# Patient Record
Sex: FEMALE | Race: WHITE | NOT HISPANIC OR LATINO | Employment: FULL TIME | URBAN - METROPOLITAN AREA
[De-identification: names, ages, dates, MRNs, and addresses within clinical notes are randomized per-mention and may not be internally consistent; named-entity substitution may affect disease eponyms.]

---

## 2017-08-15 DIAGNOSIS — F41.1 GAD (GENERALIZED ANXIETY DISORDER): ICD-10-CM

## 2017-08-15 NOTE — TELEPHONE ENCOUNTER
Medication Detail      Disp Refills Start End EMERALD   sertraline (ZOLOFT) 50 MG tablet 90 tablet 3 6/1/2016  No   Sig: Take 1 tablet (50 mg) by mouth daily   Class: E-Prescribe   Route: Oral   Order: [id number]       Last Office Visit with [facility] primary care provider:  8/11/2016        Last PHQ-9 score on record=   PHQ-9 SCORE 8/11/2016   Total Score -   Total Score 1

## 2017-08-16 NOTE — TELEPHONE ENCOUNTER
My chart message sent looks like there has been a break in medication, she should of been out in June  Carmina Lee,Clinic Rn  [facility] [place]

## 2017-08-18 NOTE — TELEPHONE ENCOUNTER
Patient hasn't read Jogg message. Left VM that she is due for a visit & gave the scheduling number. Wanted to ask about the 3 month gap.    Darleen Trejo RN

## 2017-09-20 ENCOUNTER — OFFICE VISIT (OUTPATIENT)
Dept: FAMILY MEDICINE | Facility: CLINIC | Age: 32
End: 2017-09-20
Payer: COMMERCIAL

## 2017-09-20 VITALS
WEIGHT: 213 LBS | HEIGHT: 68 IN | HEART RATE: 80 BPM | BODY MASS INDEX: 32.28 KG/M2 | SYSTOLIC BLOOD PRESSURE: 118 MMHG | TEMPERATURE: 98.4 F | DIASTOLIC BLOOD PRESSURE: 72 MMHG

## 2017-09-20 DIAGNOSIS — F33.1 MODERATE EPISODE OF RECURRENT MAJOR DEPRESSIVE DISORDER (H): Primary | ICD-10-CM

## 2017-09-20 DIAGNOSIS — G43.109 MIGRAINE WITH AURA AND WITHOUT STATUS MIGRAINOSUS, NOT INTRACTABLE: ICD-10-CM

## 2017-09-20 DIAGNOSIS — F41.1 GAD (GENERALIZED ANXIETY DISORDER): ICD-10-CM

## 2017-09-20 PROCEDURE — 99213 OFFICE O/P EST LOW 20 MIN: CPT | Performed by: FAMILY MEDICINE

## 2017-09-20 RX ORDER — NORTRIPTYLINE HCL 10 MG
30 CAPSULE ORAL AT BEDTIME
Qty: 270 CAPSULE | Refills: 3 | Status: SHIPPED | OUTPATIENT
Start: 2017-09-20 | End: 2018-02-08

## 2017-09-20 ASSESSMENT — ANXIETY QUESTIONNAIRES
GAD7 TOTAL SCORE: 14
7. FEELING AFRAID AS IF SOMETHING AWFUL MIGHT HAPPEN: MORE THAN HALF THE DAYS
3. WORRYING TOO MUCH ABOUT DIFFERENT THINGS: NEARLY EVERY DAY
5. BEING SO RESTLESS THAT IT IS HARD TO SIT STILL: NOT AT ALL
6. BECOMING EASILY ANNOYED OR IRRITABLE: NEARLY EVERY DAY
1. FEELING NERVOUS, ANXIOUS, OR ON EDGE: MORE THAN HALF THE DAYS
2. NOT BEING ABLE TO STOP OR CONTROL WORRYING: MORE THAN HALF THE DAYS

## 2017-09-20 ASSESSMENT — PATIENT HEALTH QUESTIONNAIRE - PHQ9
SUM OF ALL RESPONSES TO PHQ QUESTIONS 1-9: 18
5. POOR APPETITE OR OVEREATING: MORE THAN HALF THE DAYS

## 2017-09-20 NOTE — PATIENT INSTRUCTIONS
Ok to go back to zoloft mg. If after a couple of weeks you don't notice any improvement, ok to increase dose to 1.5 pills.     -phone follow up in a month    [facility]   Discharged by : Karon SLADE Certified Medical Assistant (AAMA)   Paper scripts provided to patient : 1 script faxed to [facility] at [phone]  If you have any questions regarding to your visit please contact your care team:   [facility] Clinic Hours Telephone Number   AB Carmona Dr., PA-C    7am-7pm Monday - Thursday   7am-5pm Fridays  [phone]   (Appointment scheduling available 24/7)   RN Line   [phone] option 2       What options do I have for visits at the clinic other than the traditional office visit?   To expand how we care for you, many of our providers are utilizing electronic visits (e-visits) and telephone visits, when medically appropriate, for interactions with their patients rather than a visit in the clinic. We also offer nurse visits for many medical concerns. Just like any other service, we will bill your insurance company for this type of visit based on time spent on the phone with your provider. Not all insurance companies cover these visits. Please check with your medical insurance if this type of visit is covered. You will be responsible for any charges that are not paid by your insurance.     E-visits via Celtaxsys: generally incur a $35.00 fee.     Telephone visits:   Time spent on the phone: *charged based on time that is spent on the phone in increments of 10 minutes. Estimated cost:   5-10 mins $30.00   11-20 mins. $59.00   21-30 mins. $85.00   Use Celtaxsys (secure email communication and access to your chart) to send your primary care provider a message or make an appointment. Ask someone on your Team how to sign up for [employer].   For a Price Quote for your services, please call our Consumer Price Line at [phone].    As always, Thank you for trusting us with your health care needs!                [facility]:    Scheduling Appointments  [place] [place]  Call: [phone]    [place] [email or web address]  Call: [phone]    [facility]  Call: [phone]

## 2017-09-20 NOTE — PROGRESS NOTES
SUBJECTIVE:   Milagro Ferrera is a 31 year old female who presents to clinic today for the following health issues:      Depression and Anxiety Follow-Up    Status since last visit: Thinks they have both worsened due to recent changes going on in personal life    Other associated symptoms:None    Complicating factors:     Significant life event: Yes-  A couple deaths in family, has a lot of stress at her job.     Current substance abuse: None    PHQ-9 SCORE 5/4/2016 6/1/2016 8/11/2016   Total Score - - -   Total Score 7 1 1     LISA-7 SCORE 5/4/2016 6/1/2016 8/11/2016   Total Score - - -   Total Score 11 5 3       PHQ-9  English  PHQ-9   Any Language  GAD7      Amount of exercise or physical activity: None outside of work    Problems taking medications regularly: Yes,  Sometimes will forget to take them.  Also has been out of her zoloft medication for about 1 month.    Medication side effects: went through some withdrawal type symptom when ran out of this medication. Had extreme heightened emotions and feeling constantly worried.     Diet: regular (no restrictions)      Medication Followup of RITALIN    Taking Medication as prescribed: NO-stopped taking it 6-7 months ago.    Side Effects:  None    Medication Helping Symptoms:  Didn't think so. She notes that it was really hard to take medication in a timely matter and decided to get off it for a week.      Reports several stressors due to both her grandparents recent bereavement. Grandfather was very secretive about his disease (lymphoma) and had a very unexpected death. Her grandfather had POA of grandmother, and then when he passed away she had to assume POA for her grandmother. Things have now settled down with regards to intrafamilial stressors. Work does continue to be very stressful and very demanding. She doesn't think zoloft was helping with her depression because she tried a trial off it for a week and didn't note any changes. Reports insomnia and  "persistent sense of despondency in her life because life feels \"bland\". Also endorses occasional suicidal ideation, but assuredly notes that she doesn't have a plan and wouldn't want to act on said ideas.     Migraines. Reports that she hasn't had any migraines since starting her nortriptyline. She does get some sporadic mild headaches, but attributes symptoms to dehydration and stress.     Questionnaire.    PHQ-9 score at 18.     LISA-7 score at 14        Suicidal ideations should be closely monitored. Advised patient if she starts having a plan.       Problem list and histories reviewed & adjusted, as indicated.  Additional history: as documented    Patient Active Problem List   Diagnosis     Moderate major depression (H)     CARDIOVASCULAR SCREENING; LDL GOAL LESS THAN 160     Migraine headache with aura     ADD (attention deficit hyperactivity disorder, inattentive type)     Past Surgical History:   Procedure Laterality Date     COSMETIC SURGERY  late 90s    mole removal: groin area and neck       Social History   Substance Use Topics     Smoking status: Passive Smoke Exposure - Never Smoker     Years: 8.00     Types: Cigarettes     Start date: 1/1/2006     Smokeless tobacco: Never Used      Comment: social smoker smoked 1-2 cigarettes/month     Alcohol use Yes      Comment: 1-2 drinks/week     Family History   Problem Relation Age of Onset     Unknown/Adopted Mother      Other Cancer Mother      cervical, melanoma, small cell carcinoma     Depression/Anxiety Mother      Bipolar     Depression Mother      MENTAL ILLNESS Mother      bi-polar     CANCER Father 44     brain     Other Cancer Father      glioblastoma     Depression/Anxiety Father      PTSD     Chemical Addiction Father      Marijuana     Depression Father      MENTAL ILLNESS Father      ptsd, depression (untreated)     Asthma Father      childhood     DIABETES Paternal Grandmother      type II caused by obesity     Other Cancer Paternal Grandmother  "     osteosarcoma     Mental Illness Paternal Grandmother      Dementia     HEART DISEASE Paternal Grandfather      Coronary Artery Disease Paternal Grandfather      Hypertension Paternal Grandfather      Hyperlipidemia Paternal Grandfather      Prostate Cancer Paternal Grandfather      Chemical Addiction Paternal Grandfather      Alcoholism     DIABETES Paternal Grandfather      type I caused by pancreatitis     Substance Abuse Paternal Grandfather      alcoholic     Thyroid Disease Paternal Grandfather      Other Cancer Paternal Grandfather      Lymphoma     Unknown/Adopted Maternal Grandmother      Depression/Anxiety Maternal Grandmother      Bipolar     Other Cancer Maternal Grandmother      Small Cell Carcinoma     Unknown/Adopted Maternal Grandfather      DIABETES Maternal Grandfather      Breast Cancer Other      Prostate Cancer Other      Great Grandfather     Chemical Addiction Other      Alcoholism     Prostate Cancer Other      Chemical Addiction Other      Alcoholism     Substance Abuse Other      alcoholic     Other Cancer Other      Small Cell Carcinoma     CEREBROVASCULAR DISEASE Other      Substance Abuse Other      alcoholic     Chemical Addiction Other      Alcoholism         Current Outpatient Prescriptions   Medication Sig Dispense Refill     sertraline (ZOLOFT) 50 MG tablet Take 1 tablet (50 mg) by mouth daily 90 tablet 3     nortriptyline (PAMELOR) 10 MG capsule Take 3 capsules (30 mg) by mouth At Bedtime 270 capsule 3     sertraline (ZOLOFT) 50 MG tablet Take 1 tablet (50 mg) by mouth daily 30 tablet 3     levonorgestrel-ethinyl estradiol (AVIANE,ALESSE,LESSINA) 0.1-20 MG-MCG per tablet Take 1 tablet by mouth daily Allow refill for 3 months with 3 refills 84 tablet 3     [DISCONTINUED] sertraline (ZOLOFT) 50 MG tablet Take 1 tablet (50 mg) by mouth daily 90 tablet 3     [DISCONTINUED] nortriptyline (PAMELOR) 10 MG capsule Take 3 capsules (30 mg) by mouth At Bedtime 270 capsule 3      "[DISCONTINUED] norgestim-eth estrad triphasic (TRINESSA, 28,) 0.18/0.215/0.25 MG-35 MCG TABS tablet Take 1 tablet by mouth daily 3 Package 3     No Known Allergies  Recent Labs   Lab Test  01/10/13   1009   LDL  133*   HDL  62   TRIG  79      BP Readings from Last 3 Encounters:   09/20/17 118/72   08/11/16 122/68   08/08/16 110/64    Wt Readings from Last 3 Encounters:   09/20/17 96.6 kg (213 lb)   08/11/16 94.5 kg (208 lb 6 oz)   08/08/16 93.4 kg (206 lb)                  Labs reviewed in EPIC          Reviewed and updated as needed this visit by clinical staff     Reviewed and updated as needed this visit by Provider         ROS:  Constitutional, HEENT, cardiovascular, pulmonary, GI, , musculoskeletal, neuro, skin, endocrine and psych systems are negative, except as otherwise noted.    This document serves as a record of the services and decisions personally performed and made by Sergio John MD. It was created on their behalf by Ady Tavera, a trained medical scribe. The creation of this document is based the provider's statements to the medical scribe.  Ady Tavera September 20, 2017 12:47 PM         OBJECTIVE:   /72 (BP Location: Right arm, Cuff Size: Adult Large)  Pulse 80  Temp 98.4  F (36.9  C) (Oral)  Ht 1.715 m (5' 7.5\")  Wt 96.6 kg (213 lb)  BMI 32.87 kg/m2  Body mass index is 32.87 kg/(m^2).  GENERAL: healthy, alert and no distress  HENT: ear canals and TM's normal, nose and mouth without ulcers or lesions  NECK: no adenopathy, no asymmetry, masses, or scars and thyroid normal to palpation  RESP: lungs clear to auscultation - no rales, rhonchi or wheezes  CV: regular rate and rhythm, normal S1 S2, no S3 or S4, no murmur, click or rub  MS: no gross musculoskeletal defects noted, no edema  PSYCH: mentation appears normal, affect normal/bright, no suicidal ideation, no homicidal ideation    Diagnostic Test Results:  none     ASSESSMENT/PLAN:   (F33.1) Moderate episode of recurrent major " depressive disorder (H)  (primary encounter diagnosis)  Comment: persistent depressive mood and insomnia. PHQ-9 score of 18 with a score of 3 on suicidal ideation. Findings were reviewed with patient, but patient not actively suicidal. SSRI therapy will be renewed.   Plan: sertraline (ZOLOFT) 50 MG tablet        -provided with Px for zoloft 50 mg QD.         -arrange for a phone follow up in a month. If not helping, consider increasing dose to 75 mg QD.      (F41.1) LISA (generalized anxiety disorder)  Comment: LISA-7 score at 14. Poorly controlled due to failure to get SSRI refilled.   Plan: sertraline (ZOLOFT) 50 MG tablet, sertraline         (ZOLOFT) 50 MG tablet        -see above    (G43.109) Migraine with aura and without status migrainosus, not intractable  Comment: controlled  Plan: nortriptyline (PAMELOR) 10 MG capsule        -medications refilled, continue present medications    Patient Instructions     Ok to go back to zoloft mg. If after a couple of weeks you don't notice any improvement, ok to increase dose to 1.5 pills.     -phone follow up in a month    [facility]   Discharged by : Karon SLADE Certified Medical Assistant (AAMA)   Paper scripts provided to patient : 1 script faxed to [facility] at [phone]  If you have any questions regarding to your visit please contact your care team:   [facility] Clinic Hours Telephone Number   AB Carmona Dr., PA-C    7am-7pm Monday - Thursday   7am-5pm Fridays  [phone]   (Appointment scheduling available 24/7)   RN Line   [phone] option 2       What options do I have for visits at the clinic other than the traditional office visit?   To expand how we care for you, many of our providers are utilizing electronic visits (e-visits) and telephone visits, when medically appropriate, for interactions with their patients rather than a visit in the clinic. We also offer  nurse visits for many medical concerns. Just like any other service, we will bill your insurance company for this type of visit based on time spent on the phone with your provider. Not all insurance companies cover these visits. Please check with your medical insurance if this type of visit is covered. You will be responsible for any charges that are not paid by your insurance.     E-visits via Naowhart: generally incur a $35.00 fee.     Telephone visits:   Time spent on the phone: *charged based on time that is spent on the phone in increments of 10 minutes. Estimated cost:   5-10 mins $30.00   11-20 mins. $59.00   21-30 mins. $85.00   Use [employer] solar systemst (secure email communication and access to your chart) to send your primary care provider a message or make an appointment. Ask someone on your Team how to sign up for [employer].   For a Price Quote for your services, please call our Consumer Price Line at [phone].   As always, Thank you for trusting us with your health care needs!                [facility]:    Scheduling Appointments  [facility] [place]  Call: [phone]    [facility]  Call: [phone]    [facility]  Call: [phone]                  The information in this document, created by the medical scribe for me, accurately reflects the services I personally performed and the decisions made by me. I have reviewed and approved this document for accuracy prior to leaving the patient care area.    rJ John MD, MD  [facility]

## 2017-09-20 NOTE — MR AVS SNAPSHOT
After Visit Summary   9/20/2017    Milagro Ferrera    MRN: [id number]           Patient Information     Date Of Birth          1985        Visit Information        Provider Department      9/20/2017 11:20 AM Jr John MD [facility]        Today's Diagnoses     Moderate episode of recurrent major depressive disorder (H)    -  1    LISA (generalized anxiety disorder)        Migraine with aura and without status migrainosus, not intractable          Care Instructions    Ok to go back to zoloft mg. If after a couple of weeks you don't notice any improvement, ok to increase dose to 1.5 pills.     -phone follow up in a month    [facility]   Discharged by : Karon SLADE Certified Medical Assistant (AAMA)   Paper scripts provided to patient : 1   If you have any questions regarding to your visit please contact your care team:   [facility] Clinic Hours Telephone Number   AB Carmona Dr., PA-C    7am-7pm Monday - Thursday   7am-5pm Fridays  [phone]   (Appointment scheduling available 24/7)   RN Line   [phone] option 2       What options do I have for visits at the clinic other than the traditional office visit?   To expand how we care for you, many of our providers are utilizing electronic visits (e-visits) and telephone visits, when medically appropriate, for interactions with their patients rather than a visit in the clinic. We also offer nurse visits for many medical concerns. Just like any other service, we will bill your insurance company for this type of visit based on time spent on the phone with your provider. Not all insurance companies cover these visits. Please check with your medical insurance if this type of visit is covered. You will be responsible for any charges that are not paid by your insurance.     E-visits via [employer]: generally incur a $35.00 fee.     Telephone visits:    Time spent on the phone: *charged based on time that is spent on the phone in increments of 10 minutes. Estimated cost:   5-10 mins $30.00   11-20 mins. $59.00   21-30 mins. $85.00   Use [employer] (secure email communication and access to your chart) to send your primary care provider a message or make an appointment. Ask someone on your Team how to sign up for Boutir.   For a Price Quote for your services, please call our [employer] Price Line at [phone].   As always, Thank you for trusting us with your health care needs!                [facility]:    Scheduling Appointments  [place] [place]  Call: [phone]    [place] [facility]  Call: [phone]    [facility]  Call: [phone]                    Follow-ups after your visit        Who to contact     If you have questions or need follow up information about today's clinic visit or your schedule please contact [facility] directly at [phone].  Normal or non-critical lab and imaging results will be communicated to you by MyChart, letter or phone within 4 business days after the clinic has received the results. If you do not hear from us within 7 days, please contact the clinic through Qu[employer]hart or phone. If you have a critical or abnormal lab result, we will notify you by phone as soon as possible.  Submit refill requests through Boutir or call your pharmacy and they will forward the refill request to us. Please allow 3 business days for your refill to be completed.          Additional Information About Your Visit        [employer] Information     Boutir gives you secure access to your electronic health record. If you see a primary care provider, you can also send messages to your care team and make appointments. If you have questions, please call your primary care clinic.  If you do not have a primary care provider, please call [phone] and they will  "assist you.        Care EveryWhere ID     This is your Care EveryWhere ID. This could be used by other organizations to access your [facility] medical records  [id number]        Your Vitals Were     Pulse Temperature Height BMI (Body Mass Index)          80 98.4  F (36.9  C) (Oral) 5' 7.5\" (1.715 m) 32.87 kg/m2         Blood Pressure from Last 3 Encounters:   09/20/17 118/72   08/11/16 122/68   08/08/16 110/64    Weight from Last 3 Encounters:   09/20/17 213 lb (96.6 kg)   08/11/16 208 lb 6 oz (94.5 kg)   08/08/16 206 lb (93.4 kg)              Today, you had the following     No orders found for display         Today's Medication Changes          These changes are accurate as of: 9/20/17 12:13 PM.  If you have any questions, ask your nurse or doctor.               These medicines have changed or have updated prescriptions.        Dose/Directions    * sertraline 50 MG tablet   Commonly known as:  ZOLOFT   This may have changed:  Another medication with the same name was added. Make sure you understand how and when to take each.   Used for:  LISA (generalized anxiety disorder)   Changed by:  Jr John MD        Dose:  50 mg   Take 1 tablet (50 mg) by mouth daily   Quantity:  90 tablet   Refills:  3       * sertraline 50 MG tablet   Commonly known as:  ZOLOFT   This may have changed:  You were already taking a medication with the same name, and this prescription was added. Make sure you understand how and when to take each.   Used for:  LISA (generalized anxiety disorder), Moderate episode of recurrent major depressive disorder (H)   Changed by:  Jr John MD        Dose:  50 mg   Take 1 tablet (50 mg) by mouth daily   Quantity:  30 tablet   Refills:  3       * Notice:  This list has 2 medication(s) that are the same as other medications prescribed for you. Read the directions carefully, and ask your doctor or other care provider to review them with you.         Where to get your medicines    "   These medications were sent to [facility] [place] - [address]  [address] [facility] [address]     Phone:  [phone]     sertraline 50 MG tablet         These medications were sent to [facility] - [place] - [address]  [address], [place] [address]     Phone:  [phone]     nortriptyline 10 MG capsule         Some of these will need a paper prescription and others can be bought over the counter.  Ask your nurse if you have questions.     Bring a paper prescription for each of these medications     sertraline 50 MG tablet                Primary Care Provider Office Phone # Fax #    Jr John [phone]       [address] [place] [address]        Equal Access to Services     PARTH [place] : Hadii hubert mattson hadasho Soomaali, waaxda luqadaha, qaybta kaalmada adeegyada, annette cancino . So [facility] [phone].    ATENCIÓN: Si habla español, tiene a fleming disposición servicios gratuitos de asistencia lingüística. Llame al [phone].    We comply with applicable federal civil rights laws and [place] laws. We do not discriminate on the basis of race, color, national origin, age, disability sex, sexual orientation or gender identity.            Thank you!     Thank you for choosing [facility]  for your care. Our goal is always to provide you with excellent care. Hearing back from our patients is one way we can continue to improve our services. Please take a few minutes to complete the written survey that you may receive in the mail after your visit with us. Thank you!             Your Updated Medication List - Protect others around you: Learn how to safely use, store and throw away your medicines at www.disposemymeds.org.          This list is accurate as of: 9/20/17 12:13 PM.  Always use your most recent med list.                    Brand Name Dispense Instructions for use Diagnosis    levonorgestrel-ethinyl estradiol 0.1-20 MG-MCG per tablet    AVIANJACQUESDIONSANDOR    84 tablet    Take 1 tablet by mouth daily Allow refill for 3 months with 3 refills    Encounter for other contraceptive management       nortriptyline 10 MG capsule    PAMELOR    270 capsule    Take 3 capsules (30 mg) by mouth At Bedtime    Migraine with aura and without status migrainosus, not intractable       * sertraline 50 MG tablet    ZOLOFT    90 tablet    Take 1 tablet (50 mg) by mouth daily    LISA (generalized anxiety disorder)       * sertraline 50 MG tablet    ZOLOFT    30 tablet    Take 1 tablet (50 mg) by mouth daily    LISA (generalized anxiety disorder), Moderate episode of recurrent major depressive disorder (H)       * Notice:  This list has 2 medication(s) that are the same as other medications prescribed for you. Read the directions carefully, and ask your doctor or other care provider to review them with you.

## 2017-09-20 NOTE — NURSING NOTE
"Chief Complaint   Patient presents with     Depression     Anxiety     Recheck Medication     RITALIN       Initial There were no vitals taken for this visit. Estimated body mass index is 32.15 kg/(m^2) as calculated from the following:    Height as of 8/11/16: 5' 7.5\" (1.715 m).    Weight as of 8/11/16: 208 lb 6 oz (94.5 kg).  Medication Reconciliation: complete   Francine Trejo CMA      "

## 2017-09-21 ENCOUNTER — MYC MEDICAL ADVICE (OUTPATIENT)
Dept: FAMILY MEDICINE | Facility: CLINIC | Age: 32
End: 2017-09-21

## 2017-09-21 ASSESSMENT — ANXIETY QUESTIONNAIRES: GAD7 TOTAL SCORE: 14

## 2018-02-08 ENCOUNTER — OFFICE VISIT (OUTPATIENT)
Dept: FAMILY MEDICINE | Facility: CLINIC | Age: 33
End: 2018-02-08
Payer: COMMERCIAL

## 2018-02-08 VITALS
DIASTOLIC BLOOD PRESSURE: 72 MMHG | HEIGHT: 68 IN | TEMPERATURE: 98.1 F | HEART RATE: 104 BPM | SYSTOLIC BLOOD PRESSURE: 122 MMHG | WEIGHT: 212 LBS | BODY MASS INDEX: 32.13 KG/M2

## 2018-02-08 DIAGNOSIS — F33.1 MODERATE EPISODE OF RECURRENT MAJOR DEPRESSIVE DISORDER (H): ICD-10-CM

## 2018-02-08 DIAGNOSIS — G43.109 MIGRAINE WITH AURA AND WITHOUT STATUS MIGRAINOSUS, NOT INTRACTABLE: ICD-10-CM

## 2018-02-08 DIAGNOSIS — F41.1 GAD (GENERALIZED ANXIETY DISORDER): ICD-10-CM

## 2018-02-08 DIAGNOSIS — M25.50 PAIN IN JOINT, MULTIPLE SITES: ICD-10-CM

## 2018-02-08 DIAGNOSIS — R63.5 WEIGHT GAIN: Primary | ICD-10-CM

## 2018-02-08 PROCEDURE — 99214 OFFICE O/P EST MOD 30 MIN: CPT | Performed by: FAMILY MEDICINE

## 2018-02-08 PROCEDURE — 36415 COLL VENOUS BLD VENIPUNCTURE: CPT | Performed by: FAMILY MEDICINE

## 2018-02-08 PROCEDURE — 80053 COMPREHEN METABOLIC PANEL: CPT | Performed by: FAMILY MEDICINE

## 2018-02-08 PROCEDURE — 86140 C-REACTIVE PROTEIN: CPT | Performed by: FAMILY MEDICINE

## 2018-02-08 PROCEDURE — 86431 RHEUMATOID FACTOR QUANT: CPT | Performed by: FAMILY MEDICINE

## 2018-02-08 PROCEDURE — 84443 ASSAY THYROID STIM HORMONE: CPT | Performed by: FAMILY MEDICINE

## 2018-02-08 RX ORDER — NORTRIPTYLINE HCL 10 MG
30 CAPSULE ORAL AT BEDTIME
Qty: 270 CAPSULE | Refills: 3 | Status: SHIPPED | OUTPATIENT
Start: 2018-02-08 | End: 2020-03-09

## 2018-02-08 ASSESSMENT — ANXIETY QUESTIONNAIRES
7. FEELING AFRAID AS IF SOMETHING AWFUL MIGHT HAPPEN: NOT AT ALL
1. FEELING NERVOUS, ANXIOUS, OR ON EDGE: NOT AT ALL
6. BECOMING EASILY ANNOYED OR IRRITABLE: NOT AT ALL
5. BEING SO RESTLESS THAT IT IS HARD TO SIT STILL: NOT AT ALL
2. NOT BEING ABLE TO STOP OR CONTROL WORRYING: NOT AT ALL
3. WORRYING TOO MUCH ABOUT DIFFERENT THINGS: NOT AT ALL
GAD7 TOTAL SCORE: 0

## 2018-02-08 ASSESSMENT — PATIENT HEALTH QUESTIONNAIRE - PHQ9: 5. POOR APPETITE OR OVEREATING: NOT AT ALL

## 2018-02-08 NOTE — PATIENT INSTRUCTIONS
-consider keeping a diary of the foods you eat    Weight loss program at [place]  Dr Kaylee Al  [facility]  [phone]      Orders Placed This Encounter     TSH with free T4 reflex     Last Lab Result: No results found for: TSH     CRP, inflammation     Comprehensive metabolic panel (BMP + Alb, Alk Phos, ALT, AST, Total. Bili, TP)     Rheumatoid factor     sertraline (ZOLOFT) 50 MG tablet     Sig: Take 1 tablet (50 mg) by mouth daily     Dispense:  90 tablet     Refill:  3     nortriptyline (PAMELOR) 10 MG capsule     Sig: Take 3 capsules (30 mg) by mouth At Bedtime     Dispense:  270 capsule     Refill:  3     sertraline (ZOLOFT) 50 MG tablet     Sig: Take 1 tablet (50 mg) by mouth daily     Dispense:  30 tablet     Refill:  3

## 2018-02-08 NOTE — MR AVS SNAPSHOT
After Visit Summary   2/8/2018    Milagro Ferrera    MRN: [id number]           Patient Information     Date Of Birth          1985        Visit Information        Provider Department      2/8/2018 4:20 PM Jr John MD [facility]        Today's Diagnoses     Weight gain    -  1    LISA (generalized anxiety disorder)        Migraine with aura and without status migrainosus, not intractable        Moderate episode of recurrent major depressive disorder (H)        Pain in joint, multiple sites          Care Instructions    -consider keeping a diary of the foods you eat    Weight loss program at [facility]  Dr Kaylee Al  [facility]  [phone]      Orders Placed This Encounter     TSH with free T4 reflex     Last Lab Result: No results found for: TSH     CRP, inflammation     Comprehensive metabolic panel (BMP + Alb, Alk Phos, ALT, AST, Total. Bili, TP)     Rheumatoid factor     sertraline (ZOLOFT) 50 MG tablet     Sig: Take 1 tablet (50 mg) by mouth daily     Dispense:  90 tablet     Refill:  3     nortriptyline (PAMELOR) 10 MG capsule     Sig: Take 3 capsules (30 mg) by mouth At Bedtime     Dispense:  270 capsule     Refill:  3     sertraline (ZOLOFT) 50 MG tablet     Sig: Take 1 tablet (50 mg) by mouth daily     Dispense:  30 tablet     Refill:  3                 Follow-ups after your visit        Who to contact     If you have questions or need follow up information about today's clinic visit or your schedule please contact [facility] directly at [phone].  Normal or non-critical lab and imaging results will be communicated to you by MyChart, letter or phone within 4 business days after the clinic has received the results. If you do not hear from us within 7 days, please contact the clinic through MyChart or phone. If you have a critical or abnormal lab result, we will notify you by phone as soon as possible.  Submit  "refill requests through [employer] or call your pharmacy and they will forward the refill request to us. Please allow 3 business days for your refill to be completed.          Additional Information About Your Visit        [employer] Information     [employer] gives you secure access to your electronic health record. If you see a primary care provider, you can also send messages to your care team and make appointments. If you have questions, please call your primary care clinic.  If you do not have a primary care provider, please call [phone] and they will assist you.        Care EveryWhere ID     This is your Care EveryWhere ID. This could be used by other organizations to access your [facility] medical records  [id number]        Your Vitals Were     Pulse Temperature Height BMI (Body Mass Index)          104 98.1  F (36.7  C) (Oral) 5' 7.5\" (1.715 m) 32.71 kg/m2         Blood Pressure from Last 3 Encounters:   02/08/18 122/72   09/20/17 118/72   08/11/16 122/68    Weight from Last 3 Encounters:   02/08/18 212 lb (96.2 kg)   09/20/17 213 lb (96.6 kg)   08/11/16 208 lb 6 oz (94.5 kg)              We Performed the Following     Comprehensive metabolic panel (BMP + Alb, Alk Phos, ALT, AST, Total. Bili, TP)     CRP, inflammation     Rheumatoid factor     TSH with free T4 reflex          Where to get your medicines      These medications were sent to [facility] [address]  [address] [place] [id number]     Phone:  [phone]     sertraline 50 MG tablet         These medications were sent to [facility] filled by [facility] - JOSE [facility]on - [address]  [address], [place] [address]     Phone:  [phone]     nortriptyline 10 MG capsule    sertraline 50 MG tablet          Primary Care Provider Office Phone # Fax #    Jr John [phone]       [address] [place] [id number]        Equal " Access to Services     [facility]: Hadii hubert mattson ayanna Rodriguez, wasebleda luqadaha, qaybta kaalmaannette nolasco. So [facility] [phone].    ATENCIÓN: Si radha rodriguez, tiene a fleming disposición servicios gratuitos de asistencia lingüística. Llame al [phone].    We comply with applicable federal civil rights laws and [place] laws. We do not discriminate on the basis of race, color, national origin, age, disability, sex, sexual orientation, or gender identity.            Thank you!     Thank you for choosing [facility]  for your care. Our goal is always to provide you with excellent care. Hearing back from our patients is one way we can continue to improve our services. Please take a few minutes to complete the written survey that you may receive in the mail after your visit with us. Thank you!             Your Updated Medication List - Protect others around you: Learn how to safely use, store and throw away your medicines at www.disposemymeds.org.          This list is accurate as of 2/8/18  5:03 PM.  Always use your most recent med list.                   Brand Name Dispense Instructions for use Diagnosis    levonorgestrel-ethinyl estradiol 0.1-20 MG-MCG per tablet    AVIANE,ALESSE,LESSINA    84 tablet    Take 1 tablet by mouth daily Allow refill for 3 months with 3 refills    Encounter for other contraceptive management       nortriptyline 10 MG capsule    PAMELOR    270 capsule    Take 3 capsules (30 mg) by mouth At Bedtime    Migraine with aura and without status migrainosus, not intractable       * sertraline 50 MG tablet    ZOLOFT    90 tablet    Take 1 tablet (50 mg) by mouth daily    LISA (generalized anxiety disorder)       * sertraline 50 MG tablet    ZOLOFT    30 tablet    Take 1 tablet (50 mg) by mouth daily    LISA (generalized anxiety disorder), Moderate episode of recurrent major depressive disorder (H)       * Notice:  This list has 2  medication(s) that are the same as other medications prescribed for you. Read the directions carefully, and ask your doctor or other care provider to review them with you.

## 2018-02-08 NOTE — PROGRESS NOTES
"  SUBJECTIVE:   Milagro Ferrera is a 32 year old female who presents to clinic today for the following health issues:      Depression and Anxiety Follow-Up    Status since last visit: Improved for both anxiety and depression.     Other associated symptoms:None    Complicating factors:     Significant life event: No     Current substance abuse: None    PHQ-9 6/1/2016 8/11/2016 9/20/2017   Total Score 1 1 18   Q9: Suicide Ideation Not at all Not at all Nearly every day     LISA-7 SCORE 6/1/2016 8/11/2016 9/20/2017   Total Score - - -   Total Score 5 3 14       PHQ-9  English  PHQ-9   Any Language  LISA-7  Suicide Assessment Five-step Evaluation and Treatment (SAFE-T)    Amount of exercise or physical activity: None to low    Problems taking medications regularly: No    Medication side effects: none    Diet: regular (no restrictions)    Thyroid concerns. Reports significant trouble losing weight, hair loss, and feeling tired. Also notes that she feels cold all the time when at home. There will be times she sleeps for up to 10 hours, however admittedly notes that it's not a restful sleep. Patient doesn't snore and no witnessed apnea. She does note that she's juggling three positions at her job and this is stressful, but she has never had difficulties coping with stress in the past. Regarding weight, she has tried cutting carbs and working out in the past but didn't make any difference. No constipation, diarrhea, or chills.     Back pain. Has had low back pain for \"a while\", worse over the past couple of months. No injuries. Family hx of RA and osteosarcoma. No known lyme exposure.         Keep a diary of the foods she eats        Elimination of carbohydrates and counting calories, portion size, healthy food shopping, keeping a diary, developing a system that will keep her accountable . Alternatively she could       Weight watchers,     4, 0    Problem list and histories reviewed & adjusted, as indicated.  Additional " history: as documented    Patient Active Problem List   Diagnosis     Moderate major depression (H)     CARDIOVASCULAR SCREENING; LDL GOAL LESS THAN 160     Migraine headache with aura     ADD (attention deficit hyperactivity disorder, inattentive type)     Past Surgical History:   Procedure Laterality Date     COSMETIC SURGERY  late 90s    mole removal: groin area and neck       Social History   Substance Use Topics     Smoking status: Former Smoker     Packs/day: 0.10     Years: 1.00     Types: Cigarettes     Start date: 5/1/2007     Quit date: 1/20/2017     Smokeless tobacco: Never Used      Comment: social smoker smoked 1-2 cigarettes/month     Alcohol use Yes      Comment: 1-2 drinks/week     Family History   Problem Relation Age of Onset     Unknown/Adopted Mother      Other Cancer Mother      cervical, melanoma, small cell carcinoma     Depression/Anxiety Mother      Bipolar     Depression Mother      Bipolar Disorder     MENTAL ILLNESS Mother      bi-polar     Breast Cancer Mother      CANCER Father 44     brain     Other Cancer Father      Glioblastoma     Depression/Anxiety Father      PTSD     Chemical Addiction Father      Marijuana     Depression Father      MENTAL ILLNESS Father      ptsd, depression (untreated)     Asthma Father      childhood     Anxiety Disorder Father      PTSD     Substance Abuse Father      Marijuana     DIABETES Paternal Grandmother      type II caused by obesity     Other Cancer Paternal Grandmother      Osteosarcoma     Mental Illness Paternal Grandmother      Dementia     OSTEOPOROSIS Paternal Grandmother      HEART DISEASE Paternal Grandfather      Coronary Artery Disease Paternal Grandfather      Hypertension Paternal Grandfather      Hyperlipidemia Paternal Grandfather      Prostate Cancer Paternal Grandfather      Chemical Addiction Paternal Grandfather      Alcoholism     DIABETES Paternal Grandfather      type I caused by pancreatitis     Substance Abuse Paternal  Grandfather      alcoholic     Thyroid Disease Paternal Grandfather      Other Cancer Paternal Grandfather      Lymphoma     Unknown/Adopted Maternal Grandmother      Depression/Anxiety Maternal Grandmother      Bipolar     Other Cancer Maternal Grandmother      Small Cell Carcinoma     Breast Cancer Maternal Grandmother      MENTAL ILLNESS Maternal Grandmother      Bipolar Disorder     Unknown/Adopted Maternal Grandfather      DIABETES Maternal Grandfather      Breast Cancer Other      Prostate Cancer Other      Great Grandfather     Chemical Addiction Other      Alcoholism     Prostate Cancer Other      Chemical Addiction Other      Alcoholism     Substance Abuse Other      alcoholic     Other Cancer Other      Small Cell Carcinoma     Breast Cancer Other      MENTAL ILLNESS Other      Bipolar Disorder     CEREBROVASCULAR DISEASE Other      Substance Abuse Other      alcoholic     Prostate Cancer Other      Chemical Addiction Other      Alcoholism         Current Outpatient Prescriptions   Medication Sig Dispense Refill     sertraline (ZOLOFT) 50 MG tablet Take 1 tablet (50 mg) by mouth daily 90 tablet 3     nortriptyline (PAMELOR) 10 MG capsule Take 3 capsules (30 mg) by mouth At Bedtime 270 capsule 3     [DISCONTINUED] sertraline (ZOLOFT) 50 MG tablet Take 1 tablet (50 mg) by mouth daily 30 tablet 3     levonorgestrel-ethinyl estradiol (AVIANE,ALESSE,LESSINA) 0.1-20 MG-MCG per tablet Take 1 tablet by mouth daily Allow refill for 3 months with 3 refills 84 tablet 3     [DISCONTINUED] norgestim-eth estrad triphasic (TRINESSA, 28,) 0.18/0.215/0.25 MG-35 MCG TABS tablet Take 1 tablet by mouth daily 3 Package 3     No Known Allergies  Recent Labs   Lab Test  01/10/13   1009   LDL  133*   HDL  62   TRIG  79      BP Readings from Last 3 Encounters:   02/08/18 122/72   09/20/17 118/72   08/11/16 122/68    Wt Readings from Last 3 Encounters:   02/08/18 96.2 kg (212 lb)   09/20/17 96.6 kg (213 lb)   08/11/16 94.5 kg (208  "lb 6 oz)                  Labs reviewed in EPIC    Reviewed and updated as needed this visit by clinical staff       Reviewed and updated as needed this visit by Provider         ROS:  Constitutional, HEENT, cardiovascular, pulmonary, GI, , musculoskeletal, neuro, skin, endocrine and psych systems are negative, except as otherwise noted.    This document serves as a record of the services and decisions personally performed and made by Sergio John MD. It was created on their behalf by Ady Tavera, a trained medical scribe. The creation of this document is based the provider's statements to the medical scribe.  Ady Tavera February 8, 2018 4:50 PM       OBJECTIVE:     /72 (BP Location: Right arm, Cuff Size: Adult Large)  Pulse 104  Temp 98.1  F (36.7  C) (Oral)  Ht 1.715 m (5' 7.5\")  Wt 96.2 kg (212 lb)  BMI 32.71 kg/m2  Body mass index is 32.71 kg/(m^2).  GENERAL: healthy, alert and no distress  HENT: ear canals and TM's normal, nose and mouth without ulcers or lesions  NECK: no adenopathy, no asymmetry, masses, or scars and thyroid normal to palpation  RESP: lungs clear to auscultation - no rales, rhonchi or wheezes  CV: regular rate and rhythm, normal S1 S2, no S3 or S4, no murmur, click or rub  MS: no gross musculoskeletal defects noted, no edema, no joint deformity  SKIN: no suspicious lesions or rashes  PSYCH: mentation appears normal, affect normal/bright    Diagnostic Test Results:  none     ASSESSMENT/PLAN:   (R63.5) Weight gain  (primary encounter diagnosis)  Comment: Trouble losing weight in the past, hair loss, and some fatigue. Possible unwholesome lifestyle, however will check labs to r/o thyroid disorder. Reviewed strategies conducive to weight loss including elimination of carbohydrates, calorie count, portion size control, healthy food shopping, and keeping a diary of the foods she consumes as well as developing a system to keep her accountable. Alternatively, she could consider the " weight loss program at [facility] with Dr. Kaylee Al. Phone number provided for patient to call Dr. Al's office.    Plan: TSH with free T4 reflex        -follow up, pending results        -pt to consider weight loss program at [facility]    (F41.1) LISA (generalized anxiety disorder)  Comment: improved. LISA 7 score of 0.   Plan: sertraline (ZOLOFT) 50 MG tablet, sertraline         (ZOLOFT) 50 MG tablet        -continue present medications    (G43.109) Migraine with aura and without status migrainosus, not intractable  Comment: controlled  Plan: nortriptyline (PAMELOR) 10 MG capsule        -medications refilled, continue present medications    (F33.1) Moderate episode of recurrent major depressive disorder (H)  Comment: Improved. PHQ-9 score of 4.   Plan: sertraline (ZOLOFT) 50 MG tablet        -medications refilled, continue present medications    (M25.50) Pain in joint, multiple sites  Comment: chronic arthralgias, exam today was unremarkable. Family hx of RA noted. Will check labs today.   Plan: CRP, inflammation, Comprehensive metabolic         panel (BMP + Alb, Alk Phos, ALT, AST, Total.         Bili, TP), Rheumatoid factor        -follow up, pending results        The information in this document, created by the medical scribe for me, accurately reflects the services I personally performed and the decisions made by me. I have reviewed and approved this document for accuracy prior to leaving the patient care area.    Jr John MD, MD  [facility]

## 2018-02-09 LAB
ALBUMIN SERPL-MCNC: 4.2 G/DL (ref 3.4–5)
ALP SERPL-CCNC: 68 U/L (ref 40–150)
ALT SERPL W P-5'-P-CCNC: 22 U/L (ref 0–50)
ANION GAP SERPL CALCULATED.3IONS-SCNC: 9 MMOL/L (ref 3–14)
AST SERPL W P-5'-P-CCNC: 19 U/L (ref 0–45)
BILIRUB SERPL-MCNC: 0.5 MG/DL (ref 0.2–1.3)
BUN SERPL-MCNC: 12 MG/DL (ref 7–30)
CALCIUM SERPL-MCNC: 9.4 MG/DL (ref 8.5–10.1)
CHLORIDE SERPL-SCNC: 108 MMOL/L (ref 94–109)
CO2 SERPL-SCNC: 25 MMOL/L (ref 20–32)
CREAT SERPL-MCNC: 0.8 MG/DL (ref 0.52–1.04)
CRP SERPL-MCNC: <2.9 MG/L (ref 0–8)
GFR SERPL CREATININE-BSD FRML MDRD: 84 ML/MIN/1.7M2
GLUCOSE SERPL-MCNC: 99 MG/DL (ref 70–99)
POTASSIUM SERPL-SCNC: 4.7 MMOL/L (ref 3.4–5.3)
PROT SERPL-MCNC: 7.8 G/DL (ref 6.8–8.8)
RHEUMATOID FACT SER NEPH-ACNC: <20 IU/ML (ref 0–20)
SODIUM SERPL-SCNC: 142 MMOL/L (ref 133–144)
TSH SERPL DL<=0.005 MIU/L-ACNC: 0.82 MU/L (ref 0.4–4)

## 2018-02-09 ASSESSMENT — PATIENT HEALTH QUESTIONNAIRE - PHQ9: SUM OF ALL RESPONSES TO PHQ QUESTIONS 1-9: 4

## 2018-02-09 ASSESSMENT — ANXIETY QUESTIONNAIRES: GAD7 TOTAL SCORE: 0

## 2018-03-15 ENCOUNTER — TELEPHONE (OUTPATIENT)
Dept: FAMILY MEDICINE | Facility: CLINIC | Age: 33
End: 2018-03-15

## 2018-03-15 NOTE — TELEPHONE ENCOUNTER
PHQ-9 completed 2/18/18 at [facility]:  PHQ-9 score:    PHQ-9 SCORE 2/8/2018   Total Score -   Total Score 4         Franko Campbell RN

## 2018-03-15 NOTE — TELEPHONE ENCOUNTER
Please repeat PHQ-9.  Index date: 9/20/17  Follow up start date:  2/20/18  Follow up end date:  4/20/18    Paige Mooney MA

## 2018-09-26 ENCOUNTER — OFFICE VISIT (OUTPATIENT)
Dept: FAMILY MEDICINE | Facility: CLINIC | Age: 33
End: 2018-09-26
Payer: COMMERCIAL

## 2018-09-26 ENCOUNTER — RADIANT APPOINTMENT (OUTPATIENT)
Dept: GENERAL RADIOLOGY | Facility: CLINIC | Age: 33
End: 2018-09-26
Attending: FAMILY MEDICINE
Payer: COMMERCIAL

## 2018-09-26 VITALS
BODY MASS INDEX: 31.67 KG/M2 | TEMPERATURE: 99.1 F | HEART RATE: 80 BPM | SYSTOLIC BLOOD PRESSURE: 114 MMHG | DIASTOLIC BLOOD PRESSURE: 58 MMHG | WEIGHT: 209 LBS | HEIGHT: 68 IN

## 2018-09-26 DIAGNOSIS — M54.41 CHRONIC MIDLINE LOW BACK PAIN WITH RIGHT-SIDED SCIATICA: Primary | ICD-10-CM

## 2018-09-26 DIAGNOSIS — G89.29 CHRONIC MIDLINE LOW BACK PAIN WITH RIGHT-SIDED SCIATICA: ICD-10-CM

## 2018-09-26 DIAGNOSIS — G89.29 CHRONIC MIDLINE LOW BACK PAIN WITH RIGHT-SIDED SCIATICA: Primary | ICD-10-CM

## 2018-09-26 DIAGNOSIS — M54.41 CHRONIC MIDLINE LOW BACK PAIN WITH RIGHT-SIDED SCIATICA: ICD-10-CM

## 2018-09-26 PROCEDURE — 72100 X-RAY EXAM L-S SPINE 2/3 VWS: CPT | Mod: FY

## 2018-09-26 PROCEDURE — 99214 OFFICE O/P EST MOD 30 MIN: CPT | Performed by: FAMILY MEDICINE

## 2018-09-26 PROCEDURE — 99207 ZZC FOR CODING REVIEW: CPT | Performed by: FAMILY MEDICINE

## 2018-09-26 NOTE — PATIENT INSTRUCTIONS
Orders Placed This Encounter     XR Lumbar Spine 2/3 Views     Standing Status:   Future     Number of Occurrences:   1     Standing Expiration Date:   9/26/2019     Order Specific Question:   Priority     Answer:   Routine     Order Specific Question:   Is this exam to be performed at [facility]?     Answer:   Yes     Order Specific Question:   Is this study to be performed on the EOS machine?     Answer:   No     ORTHO  REFERRAL     Referral Type:   Consultation     [facility]     Standing Status:   Future     Standing Expiration Date:   9/26/2019     Referral Type:   [facility]       Your provider has referred you to: [facility], [phone]    [facility]   Discharged by : Sundeep Clark MA    Paper scripts provided to patient : none   If you have any questions regarding to your visit please contact your care team:     Team Silver              Clinic Hours Telephone Number     Dr. Sergio Richard PA-C   7am-7pm  Monday - Thursday   7am-5pm  Fridays  [phone]   (Appointment scheduling available 24/7)     RN Line  [phone] option 2     Urgent Care - [place] and [place] - 11am-9pm Monday-Friday Saturday-Sunday- 9am-5pm     [place] -   5pm-9pm Monday-Friday Saturday-Sunday- 9am-5pm    [phone] - [place]    [phone] - [place]     For a Price Quote for your services, please call our Consumer Price Line at [phone].     What options do I have for visits at the clinic other than the traditional office visit?     To expand how we care for you, many of our providers are utilizing electronic visits (e-visits) and telephone visits, when medically appropriate, for interactions with their patients rather than a visit in the clinic. We also offer nurse visits for many medical concerns. Just like any other service,  we will bill your insurance company for this type of visit based on time spent on the phone with your provider. Not all insurance companies cover these visits. Please check with your medical insurance if this type of visit is covered. You will be responsible for any charges that are not paid by your insurance.   E-visits via [employer] Inchart: generally incur a $35.00 fee.     Telephone visits:   Time spent on the phone: *charged based on time that is spent on the phone in increments of 10 minutes. Estimated cost:   5-10 mins $30.00   11-20 mins. $59.00   21-30 mins. $85.00     Use [employer] (secure email communication and access to your chart) to send your primary care provider a message or make an appointment. Ask someone on your Team how to sign up for [employer].     As always, Thank you for trusting us with your health care needs!      [facility]:    Scheduling Appointments  [facility]  Call: [phone]    [facility]  Call: [phone]    [facility]  Call: [phone]    For Gastroenterology referrals   [facility]   Clinics and Surgery Center, 4th Floor   [address] [place] [address]   Appointments: [phone]    WHERE TO GO FOR CARE?  Clinic    Make an appointment if you:       Are sick (cold, cough, flu, sore throat, earache or in pain).       Have a small injury (sprain, small cut, burn or broken bone).       Need a physical exam, Pap smear, vaccine or prescription refill.       Have questions about your health or medicines.    To reach us:      Call [phone] ([phone]). Open 24 hours every day. (For counseling services, call [phone].)    Log into Asurint at [email or web address]. (Visit [email or web address] to create an account.) Hospital emergency room    An emergency is a serious or life- threatening problem that must be treated right away.    Call 438 or get to the hospital  if you have:      Very bad or sudden:            - Chest pain or pressure         - Bleeding         - Head or belly pain         - Dizziness or trouble seeing, walking or                          Speaking      Problems breathing      Blood in your vomit or you are coughing up blood      A major injury (knocked out, loss of a finger or limb, rape, broken bone protruding from skin)    A mental health crisis. (Or call the Mental Health Crisis line at 1-891.254.3947 or Suicide Prevention Hotline at 1-802.617.1051.)    Open 24 hours every day. You don't need an appointment.     Urgent care    Visit urgent care for sickness or small injuries when the clinic is closed. You don't need an appointment. To check hours or find an urgent care near you, visit [email or web address]. Online care    Get online care from [facility] for more than 70 common problems, like colds, allergies and infections. Open 24 hours every day at:   [email or web address]   Need help deciding?    For advice about where to be seen, you may call your clinic and ask to speak with a nurse. We're here for you 24 hours every day.         If you are deaf or hard of hearing, please let us know. We provide many free services including sign language interpreters, oral interpreters, TTYs, telephone amplifiers, note takers and written materials.

## 2018-09-26 NOTE — PROGRESS NOTES
"  SUBJECTIVE:   Milagro Ferrera is a 32 year old female who presents to clinic today for the following health issues:      Back Pain       Duration: Since 2007,        Specific cause: lifting a dog onto the surgery table at work    Description:   Location of pain: low back both, she feels like there is a mass on her spine and to the left side, 1x2 1/2\" long  Character of pain: sharp, stabbing  Pain radiation:radiates into the right leg and radiates into the left leg  New numbness or weakness in legs, not attributed to pain:  no     Intensity: At its worst 20/10, daily 6-7/10    History:   Pain interferes with job: YES  History of back problems: recurrent self limited episodes of low back pain in the past  Any previous MRI or X-rays: None  Sees a specialist for back pain:  No    Therapies tried without relief: chiropractor     Alleviating factors:   Improved by: back brace, acetaminophen (Tylenol) and Naproxen, icy hot        Precipitating factors:  Worsened by: dead lifting a large dog, squatting down          Accompanying Signs & Symptoms:  Risk of Fracture:  None  Risk of Cauda Equina:  None  Risk of Infection:  None  Risk of Cancer:  None  Risk of Ankylosing Spondylitis:  Onset at age <35, male, AND morning back stiffness. YES- XR negative today    Reports onset of lumbar back pain in 2007 without any specific incidental injuries. Pt posits that the bulk of her back problems stemmed from lifting bags of cat litter weighing up to 30 lbs multiple times per day. Earlier this year, she was lifting a 90 lbs dog when she felt like she aggravated her back pain. Experiences pain in her lower back when either sitting or standing for prolonged periods of period, typically pain obliges rest after walking for more than 5 minutes. Alternates laterality between her right and left lower back. Also endorses radicular symptoms into her right leg. No weakness, ataxia, gait abnormality, numbness, saddle anesthesia, loss of bowel or " bladder dysfunction. Has difficulty finding comfortable position to sleep in. Friend, who is a chiropractic massage therapist, did some harsh readjustments on her back and made things worse.       Problem list and histories reviewed & adjusted, as indicated.  Additional history: as documented    Patient Active Problem List   Diagnosis     Moderate major depression (H)     CARDIOVASCULAR SCREENING; LDL GOAL LESS THAN 160     Migraine headache with aura     ADD (attention deficit hyperactivity disorder, inattentive type)     Past Surgical History:   Procedure Laterality Date     COSMETIC SURGERY  late 90s    mole removal: groin area and neck       Social History   Substance Use Topics     Smoking status: Current Every Day Smoker     Packs/day: 0.10     Years: 1.00     Types: Other     Start date: 5/1/2007     Last attempt to quit: 1/20/2017     Smokeless tobacco: Current User      Comment: social smoker smoked 1-2 cigarettes/month     Alcohol use Yes      Comment: 1-2 drinks/week     Family History   Problem Relation Age of Onset     Unknown/Adopted Mother      Other Cancer Mother      cervical, melanoma, small cell carcinoma     Depression/Anxiety Mother      Bipolar     Depression Mother      Bipolar Disorder     Mental Illness Mother      bi-polar     Breast Cancer Mother      Cancer Father 44     brain     Other Cancer Father      Glioblastoma     Depression/Anxiety Father      PTSD     Chemical Addiction Father      Marijuana     Depression Father      Mental Illness Father      ptsd, depression (untreated)     Asthma Father      childhood     Anxiety Disorder Father      PTSD     Substance Abuse Father      Marijuana     Diabetes Paternal Grandmother      type II caused by obesity     Other Cancer Paternal Grandmother      Osteosarcoma     Mental Illness Paternal Grandmother      Dementia     Osteoporosis Paternal Grandmother      HEART DISEASE Paternal Grandfather      Coronary Artery Disease Paternal  Grandfather      Hypertension Paternal Grandfather      Hyperlipidemia Paternal Grandfather      Prostate Cancer Paternal Grandfather      Chemical Addiction Paternal Grandfather      Alcoholism     Diabetes Paternal Grandfather      type I caused by pancreatitis     Substance Abuse Paternal Grandfather      alcoholic     Thyroid Disease Paternal Grandfather      Other Cancer Paternal Grandfather      Lymphoma     Unknown/Adopted Maternal Grandmother      Depression/Anxiety Maternal Grandmother      Bipolar     Other Cancer Maternal Grandmother      Small Cell Carcinoma     Breast Cancer Maternal Grandmother      Mental Illness Maternal Grandmother      Bipolar Disorder     Unknown/Adopted Maternal Grandfather      Diabetes Maternal Grandfather      Breast Cancer Other      Prostate Cancer Other      Great Grandfather     Chemical Addiction Other      Alcoholism     Prostate Cancer Other      Chemical Addiction Other      Alcoholism     Substance Abuse Other      alcoholic     Other Cancer Other      Small Cell Carcinoma     Breast Cancer Other      Mental Illness Other      Bipolar Disorder     Cerebrovascular Disease Other      Substance Abuse Other      alcoholic     Prostate Cancer Other      Chemical Addiction Other      Alcoholism         Current Outpatient Prescriptions   Medication Sig Dispense Refill     levonorgestrel-ethinyl estradiol (AVIANE,ALESSE,LESSINA) 0.1-20 MG-MCG per tablet Take 1 tablet by mouth daily Allow refill for 3 months with 3 refills 84 tablet 3     nortriptyline (PAMELOR) 10 MG capsule Take 3 capsules (30 mg) by mouth At Bedtime 270 capsule 3     sertraline (ZOLOFT) 50 MG tablet Take 1 tablet (50 mg) by mouth daily 90 tablet 3     [DISCONTINUED] norgestim-eth estrad triphasic (TRINESSA, 28,) 0.18/0.215/0.25 MG-35 MCG TABS tablet Take 1 tablet by mouth daily 3 Package 3     [DISCONTINUED] sertraline (ZOLOFT) 50 MG tablet Take 1 tablet (50 mg) by mouth daily 30 tablet 3     No Known  "Allergies  Recent Labs   Lab Test  02/08/18   1706  01/10/13   1009   LDL   --   133*   HDL   --   62   TRIG   --   79   ALT  22   --    CR  0.80   --    GFRESTIMATED  84   --    GFRESTBLACK  >90   --    POTASSIUM  4.7   --    TSH  0.82   --       BP Readings from Last 3 Encounters:   09/26/18 114/58   02/08/18 122/72   09/20/17 118/72    Wt Readings from Last 3 Encounters:   09/26/18 94.8 kg (209 lb)   02/08/18 96.2 kg (212 lb)   09/20/17 96.6 kg (213 lb)                  Labs reviewed in EPIC    Reviewed and updated as needed this visit by clinical staff  Tobacco  Allergies  Meds  Med Hx  Surg Hx  Fam Hx  Soc Hx      Reviewed and updated as needed this visit by Provider         ROS:  Constitutional, HEENT, cardiovascular, pulmonary, GI, , musculoskeletal, neuro, skin, endocrine and psych systems are negative, except as otherwise noted.    This document serves as a record of the services and decisions personally performed and made by Sergio John MD. It was created on their behalf by Ady Tavera, a trained medical scribe. The creation of this document is based the provider's statements to the medical scribe.  Ady Tavera September 26, 2018 3:58 PM       OBJECTIVE:     /58 (Cuff Size: Adult Large)  Pulse 80  Temp 99.1  F (37.3  C) (Oral)  Ht 1.715 m (5' 7.5\")  Wt 94.8 kg (209 lb)  LMP 09/07/2018 (Approximate)  BMI 32.25 kg/m2  Body mass index is 32.25 kg/(m^2).  GENERAL: healthy, alert and no distress  HENT: ear canals and TM's normal, nose and mouth without ulcers or lesions  RESP: lungs clear to auscultation - no rales, rhonchi or wheezes  CV: regular rate and rhythm, normal S1 S2, no S3 or S4, no murmur, click or rub  MS: no gross musculoskeletal defects noted, low back: perispinal muscle tenderness (R>L), palpation triggered radicular pain to the right, negative SLR, normal DTR's in upper and lower extremities bilaterally  SKIN: no suspicious lesions or rashes  PSYCH: mentation appears " normal, affect normal/bright    Diagnostic Test Results:  XR lumbar was negative for osseous abnormalities or congenital aberrations per Dr. John's review    ASSESSMENT/PLAN:   (M54.41,  G89.29) Chronic midline low back pain with right-sided sciatica  (primary encounter diagnosis)  Comment: given chronicity of pain, will refer patient to non-surgical specialist and physical therapy. Discussed conservative therapy with lidocaine patches and judicious use of NSAIDs.   Plan: XR Lumbar Spine 2/3 Views, ORTHO          REFERRAL, [facility] PT, HAND, AND CHIROPRACTIC         REFERRAL        -salonpas and ibuprofen       -physical therapy and non surgical orthopedics.       The information in this document, created by the medical scribe for me, accurately reflects the services I personally performed and the decisions made by me. I have reviewed and approved this document for accuracy prior to leaving the patient care area.    Jr John MD, MD  [facility]

## 2018-09-26 NOTE — LETTER
[address] [address]  [phone]          September 26, 2018      RE: Milagro Ferrera                                                                       To Whom it May Concern:    Milagro Ferrera is a patient of mine who has back pain that we are evaluating. It is my recommendation at this time that she works no longer than 8 hour shifts.             Sincerely,    Jr John MD

## 2018-09-26 NOTE — MR AVS SNAPSHOT
After Visit Summary   9/26/2018    Milagro Ferrera    MRN: [id number]           Patient Information     Date Of Birth          1985        Visit Information        Provider Department      9/26/2018 1:40 PM Jr John MD [facility]        Today's Diagnoses     Chronic midline low back pain with right-sided sciatica    -  1      Care Instructions    Orders Placed This Encounter     XR Lumbar Spine 2/3 Views     Standing Status:   Future     Number of Occurrences:   1     Standing Expiration Date:   9/26/2019     Order Specific Question:   Priority     Answer:   Routine     Order Specific Question:   Is this exam to be performed at [facility]?     Answer:   Yes     Order Specific Question:   Is this study to be performed on the EOS machine?     Answer:   No     ORTHO  REFERRAL     Referral Type:   Consultation     [facility] PT, HAND, AND CHIROPRACTIC REFERRAL     Standing Status:   Future     Standing Expiration Date:   9/26/2019     Referral Type:   [facility] Physical Therapy       Your provider has referred you to: [facility], [phone]    [facility]   Discharged by : Sundeep Clark MA    Paper scripts provided to patient : none   If you have any questions regarding to your visit please contact your care team:     Team Silver              Clinic Hours Telephone Number     Dr. Sergio Rihcard PA-C   7am-7pm  Monday - Thursday   7am-5pm  Fridays  [phone]   (Appointment scheduling available 24/7)     RN Line  [phone] option 2     Urgent Care - [place] - 11am-9pm Monday-Friday Saturday-Sunday- 9am-5pm     [place] -   5pm-9pm Monday-Friday Saturday-Sunday- 9am-5pm    [phone] - Sharlene Torres    [phone] - [place]     For a Price Quote for your services, please call our Consumer Price Line at  [phone].     What options do I have for visits at the clinic other than the traditional office visit?     To expand how we care for you, many of our providers are utilizing electronic visits (e-visits) and telephone visits, when medically appropriate, for interactions with their patients rather than a visit in the clinic. We also offer nurse visits for many medical concerns. Just like any other service, we will bill your insurance company for this type of visit based on time spent on the phone with your provider. Not all insurance companies cover these visits. Please check with your medical insurance if this type of visit is covered. You will be responsible for any charges that are not paid by your insurance.   E-visits via [employer]: generally incur a $35.00 fee.     Telephone visits:   Time spent on the phone: *charged based on time that is spent on the phone in increments of 10 minutes. Estimated cost:   5-10 mins $30.00   11-20 mins. $59.00   21-30 mins. $85.00     Use [employer] (secure email communication and access to your chart) to send your primary care provider a message or make an appointment. Ask someone on your Team how to sign up for [employer].     As always, Thank you for trusting us with your health care needs!      [facility]:    Scheduling Appointments  [place]  Call: [phone]    [facility]  Call: [phone]    [facility]  Call: [phone]    For Gastroenterology referrals   [facility]   Clinics and Surgery Center, 4th Floor   [address] [place] [address]   Appointments: [phone]    WHERE TO GO FOR CARE?  Clinic    Make an appointment if you:       Are sick (cold, cough, flu, sore throat, earache or in pain).       Have a small injury (sprain, small cut, burn or broken bone).       Need a physical exam, Pap smear, vaccine or prescription refill.       Have  questions about your health or medicines.    To reach us:      Call [phone] ([phone]). Open 24 hours every day. (For counseling services, call [phone].)    Log into [employer] at [employer]. (Visit [email or web address] to create an account.) Hospital emergency room    An emergency is a serious or life- threatening problem that must be treated right away.    Call 911 or get to the hospital if you have:      Very bad or sudden:            - Chest pain or pressure         - Bleeding         - Head or belly pain         - Dizziness or trouble seeing, walking or                          Speaking      Problems breathing      Blood in your vomit or you are coughing up blood      A major injury (knocked out, loss of a finger or limb, rape, broken bone protruding from skin)    A mental health crisis. (Or call the Mental Health Crisis line at 1-234.487.5138 or Suicide Prevention Hotline at 1-441.681.8790.)    Open 24 hours every day. You don't need an appointment.     Urgent care    Visit urgent care for sickness or small injuries when the clinic is closed. You don't need an appointment. To check hours or find an urgent care near you, visit [email or web address]. Online care    Get online care from [facility] for more than 70 common problems, like colds, allergies and infections. Open 24 hours every day at:   [email or web address]   Need help deciding?    For advice about where to be seen, you may call your clinic and ask to speak with a nurse. We're here for you 24 hours every day.         If you are deaf or hard of hearing, please let us know. We provide many free services including sign language interpreters, oral interpreters, TTYs, telephone amplifiers, note takers and written materials.               Follow-ups after your visit        Additional Services     [facility] PT, HAND, AND CHIROPRACTIC REFERRAL       Physical Therapy, Hand Therapy and Chiropractic Care are available through:  *[facility]  *Hand Therapy (Occupational Therapy or Physical Therapy)  *[facility]    Call one number to schedule at any of the above locations: [phone].    Physical therapy, Hand therapy and/or Chiropractic care has been recommended by your physician as an excellent treatment option to reduce pain and help people return to normal activities, including sports.  Therapy and/or chiropractic care services are a great complement or alternative to expensive and invasive surgery, injections, or long-term use of prescription medications. The primary goal is to identify the underlying problem and provide you the tools to manage your condition on your own.     Please be aware that coverage of these services is subject to the terms and limitations of your health insurance plan.  Call member services at your health plan with any benefit or coverage questions.      Please bring the following to your appointment:  *Your personal calendar for scheduling future appointments  *Comfortable clothing            ORTHO  REFERRAL       [facility] is referring you to the Orthopedic  Services at [facility].       The  Representative will assist you in the coordination of your Orthopedic and Musculoskeletal Care as prescribed by your physician.    The  Representative will call you within 1 business day to help schedule your appointment, or you may contact the  Representative at:    All areas ~ [phone]     Type of Referral : Spine: Lumbar: Medical Spine/Non-Surgical Specialist        Timeframe requested: Routine    Coverage of these services is subject to the terms and limitations of your health insurance plan.  Please call member services at your health plan with any benefit or coverage questions.      If X-rays, CT or MRI's have been performed, please contact the facility where they were done to arrange for , prior  "to your scheduled appointment.  Please bring this referral request to your appointment and present it to your specialist.                  Future tests that were ordered for you today     Open Future Orders        Priority Expected Expires Ordered    [facility] PT, HAND, AND CHIROPRACTIC REFERRAL Routine  9/26/2019 9/26/2018            Who to contact     If you have questions or need follow up information about today's clinic visit or your schedule please contact [facility] directly at [phone].  Normal or non-critical lab and imaging results will be communicated to you by [employer], letter or phone within 4 business days after the clinic has received the results. If you do not hear from us within 7 days, please contact the clinic through [employer] or phone. If you have a critical or abnormal lab result, we will notify you by phone as soon as possible.  Submit refill requests through [employer] or call your pharmacy and they will forward the refill request to us. Please allow 3 business days for your refill to be completed.          Additional Information About Your Visit        [employer] Information     [employer] gives you secure access to your electronic health record. If you see a primary care provider, you can also send messages to your care team and make appointments. If you have questions, please call your primary care clinic.  If you do not have a primary care provider, please call [phone] and they will assist you.        Care EveryWhere ID     This is your Care EveryWhere ID. This could be used by other organizations to access your [place] medical records  [id number]        Your Vitals Were     Pulse Temperature Height Last Period BMI (Body Mass Index)       80 99.1  F (37.3  C) (Oral) 5' 7.5\" (1.715 m) 09/07/2018 (Approximate) 32.25 kg/m2        Blood Pressure from Last 3 Encounters:   09/26/18 114/58   02/08/18 122/72   09/20/17 118/72    Weight from Last 3 Encounters:   09/26/18 209 lb (94.8 " kg)   02/08/18 212 lb (96.2 kg)   09/20/17 213 lb (96.6 kg)              We Performed the Following     ORTHO  REFERRAL        Primary Care Provider Office Phone # Fax #    Jr John [phone] [address] [facility] [address]        Equal Access to Services     [facility]RI[facility] : Hadii aad ku hadasho Soomaali, waaxda luqadaha, qaybta kaalmada adeegyada, waxay idiin hayaan adeeg khmadihadonita lagermain . So [facility] [phone].    ATENCIÓN: Si habla español, tiene a fleming disposición servicios gratuitos de asistencia lingüística. Robbie al [phone].    We comply with applicable federal civil rights laws and [place] laws. We do not discriminate on the basis of race, color, national origin, age, disability, sex, sexual orientation, or gender identity.            Thank you!     Thank you for choosing [facility]  for your care. Our goal is always to provide you with excellent care. Hearing back from our patients is one way we can continue to improve our services. Please take a few minutes to complete the written survey that you may receive in the mail after your visit with us. Thank you!             Your Updated Medication List - Protect others around you: Learn how to safely use, store and throw away your medicines at www.disposemymeds.org.          This list is accurate as of 9/26/18  2:43 PM.  Always use your most recent med list.                   Brand Name Dispense Instructions for use Diagnosis    levonorgestrel-ethinyl estradiol 0.1-20 MG-MCG per tablet    AVIANE,ALESSE,LESSINA    84 tablet    Take 1 tablet by mouth daily Allow refill for 3 months with 3 refills    Encounter for other contraceptive management       nortriptyline 10 MG capsule    PAMELOR    270 capsule    Take 3 capsules (30 mg) by mouth At Bedtime    Migraine with aura and without status migrainosus, not intractable       sertraline 50 MG tablet    ZOLOFT    90 tablet    Take 1 tablet  (50 mg) by mouth daily    LISA (generalized anxiety disorder)

## 2018-10-20 ENCOUNTER — HEALTH MAINTENANCE LETTER (OUTPATIENT)
Age: 33
End: 2018-10-20

## 2018-11-08 ENCOUNTER — MYC MEDICAL ADVICE (OUTPATIENT)
Dept: FAMILY MEDICINE | Facility: CLINIC | Age: 33
End: 2018-11-08

## 2018-11-08 NOTE — LETTER
[address] [id number] [phone]                                                                                                November 15, 2018    Milagro Ferrera  [address] [place] [address] [address]   [facility] [address]        Dear Ms. Ferrera,    At [facility] we care about your health and well-being. A review of your chart has indicated that you are due for a(n) Pap and physical exam.  Please contact us at [phone] to schedule your appointment. If a referral is required for the test, a copy is enclosed with this letter.    If you have already had one or all of the above screening tests at another facility, please call us to update your chart.     You may contact the clinic at [phone] if you have any questions or concerns about this request.      Sincerely,      Jr John MD/sa

## 2018-11-08 NOTE — TELEPHONE ENCOUNTER
Panel Management Review      Patient has the following on her problem list:     Depression / Dysthymia review    Measure:  Needs PHQ-9 score of 4 or less during index window.  Administer PHQ-9 and if score is 5 or more, send encounter to provider for next steps.    5 - 7 month window range: n/a    PHQ-9 SCORE 8/11/2016 9/20/2017 2/8/2018   Total Score - - -   Total Score 1 18 4       If PHQ-9 recheck is 5 or more, route to provider for next steps.    Patient is due for:  PHQ9      Composite cancer screening  Chart review shows that this patient is due/due soon for the following Pap Smear  Summary:    Patient is due/failing the following:   DAP, PAP, PHQ9 and PHYSICAL    Action needed:   Patient needs office visit for physical and pap.    Type of outreach:    Sent Happy [employer] Pal message.    Questions for provider review:    None                                                                                                                                    Karon Andrade, Certified Medical Assistant (AAMA)      Chart routed to Care Team .

## 2019-01-04 ENCOUNTER — MYC MEDICAL ADVICE (OUTPATIENT)
Dept: FAMILY MEDICINE | Facility: CLINIC | Age: 34
End: 2019-01-04

## 2019-01-13 ENCOUNTER — TRANSFERRED RECORDS (OUTPATIENT)
Dept: HEALTH INFORMATION MANAGEMENT | Facility: CLINIC | Age: 34
End: 2019-01-13

## 2019-01-29 ENCOUNTER — OFFICE VISIT (OUTPATIENT)
Dept: FAMILY MEDICINE | Facility: CLINIC | Age: 34
End: 2019-01-29
Payer: COMMERCIAL

## 2019-01-29 VITALS
TEMPERATURE: 97.5 F | HEIGHT: 68 IN | BODY MASS INDEX: 30.62 KG/M2 | SYSTOLIC BLOOD PRESSURE: 117 MMHG | WEIGHT: 202 LBS | OXYGEN SATURATION: 97 % | HEART RATE: 98 BPM | DIASTOLIC BLOOD PRESSURE: 70 MMHG

## 2019-01-29 DIAGNOSIS — G89.29 CHRONIC BILATERAL LOW BACK PAIN WITHOUT SCIATICA: Primary | ICD-10-CM

## 2019-01-29 DIAGNOSIS — M54.50 CHRONIC BILATERAL LOW BACK PAIN WITHOUT SCIATICA: Primary | ICD-10-CM

## 2019-01-29 PROCEDURE — 99213 OFFICE O/P EST LOW 20 MIN: CPT | Performed by: FAMILY MEDICINE

## 2019-01-29 RX ORDER — TIZANIDINE 2 MG/1
2 TABLET ORAL 3 TIMES DAILY
Qty: 60 TABLET | Refills: 3 | Status: SHIPPED | OUTPATIENT
Start: 2019-01-29 | End: 2020-01-29

## 2019-01-29 RX ORDER — CYCLOBENZAPRINE HCL 10 MG
10 TABLET ORAL 3 TIMES DAILY PRN
COMMUNITY
End: 2019-01-29 | Stop reason: ALTCHOICE

## 2019-01-29 RX ORDER — NAPROXEN 500 MG/1
500 TABLET ORAL 2 TIMES DAILY WITH MEALS
Qty: 60 TABLET | Refills: 5 | Status: SHIPPED | OUTPATIENT
Start: 2019-01-29 | End: 2019-12-16

## 2019-01-29 SDOH — HEALTH STABILITY: MENTAL HEALTH: HOW OFTEN DO YOU HAVE A DRINK CONTAINING ALCOHOL?: NEVER

## 2019-01-29 ASSESSMENT — MIFFLIN-ST. JEOR: SCORE: 1679.77

## 2019-01-29 ASSESSMENT — PATIENT HEALTH QUESTIONNAIRE - PHQ9: SUM OF ALL RESPONSES TO PHQ QUESTIONS 1-9: 11

## 2019-01-29 NOTE — PATIENT INSTRUCTIONS
[place] [facility] ([facility])  TO SCHEDULE AN APPOINTMENT CALL:  [phone]    Orders Placed This Encounter     [facility], AND CH[facility]RACT[facility] REFERRAL     Standing Status:   Future     Standing Expiration Date:   1/29/2020     Referral Type:   [facility] Physical Therapy     Number of Visits Requested:   1              \    Orders Placed This Encounter     [facility] PT, HAND, AND CHIROPRACTIC REFERRAL     Standing Status:   Future     Standing Expiration Date:   1/29/2020     Referral Type:   [facility] Physical Therapy     Number of Visits Requested:   1     DISCONTD: cyclobenzaprine (FLEXERIL) 10 MG tablet     Sig: Take 10 mg by mouth 3 times daily as needed for muscle spasms     naproxen (NAPROSYN) 500 MG tablet     Sig: Take 1 tablet (500 mg) by mouth 2 times daily (with meals)     Dispense:  60 tablet     Refill:  5     tiZANidine (ZANAFLEX) 2 MG tablet     Sig: Take 1 tablet (2 mg) by mouth 3 times daily     Dispense:  60 tablet     Refill:  3

## 2019-01-29 NOTE — PROGRESS NOTES
SUBJECTIVE:   Milagro Ferrera is a 33 year old female who presents to clinic today for the following health issues:      Follow up on back pain and work restrictions     Has difficulty working more than 8 hours per day she gets more back spasm.   Has been unable to get into PT or ortho eval due to her work schedule.     Has mid back pain with radiation to both hips. No radicular pain below hips.    Had recent back spasm at work. 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 Treated with muscle relaxor and made her very tired.             Problem list and histories reviewed & adjusted, as indicated.  Additional history: as documented    Patient Active Problem List   Diagnosis     Moderate major depression (H)     CARDIOVASCULAR SCREENING; LDL GOAL LESS THAN 160     Migraine headache with aura     ADD (attention deficit hyperactivity disorder, inattentive type)     Chronic bilateral low back pain without sciatica     Past Surgical History:   Procedure Laterality Date     COSMETIC SURGERY  late     mole removal: groin area and neck       Social History     Tobacco Use     Smoking status: Current Every Day Smoker     Packs/day: 0.10     Years: 1.00     Pack years: 0.10     Types: Other     Start date: 2007     Last attempt to quit: 2017     Years since quittin.0     Smokeless tobacco: Current User     Tobacco comment: social smoker smoked 1-2 cigarettes/month   Substance Use Topics     Alcohol use: No     Frequency: Never     Comment: rare     Family History   Problem Relation Age of Onset     Unknown/Adopted Mother      Other Cancer Mother         cervical, melanoma, small cell carcinoma     Depression/Anxiety Mother         Bipolar     Depression Mother         Bipolar Disorder     Mental Illness Mother         bi-polar     Breast Cancer Mother      Cancer Father 44        brain     Other Cancer Father         Glioblastoma     Depression/Anxiety Father         PTSD     Chemical Addiction Father          Marijuana     Depression Father      Mental Illness Father         ptsd, depression (untreated)     Asthma Father         childhood     Anxiety Disorder Father         PTSD     Substance Abuse Father         Marijuana     Diabetes Paternal Grandmother         type II caused by obesity     Other Cancer Paternal Grandmother         Osteosarcoma     Mental Illness Paternal Grandmother         Dementia     Osteoporosis Paternal Grandmother      Heart Disease Paternal Grandfather      Coronary Artery Disease Paternal Grandfather      Hypertension Paternal Grandfather      Hyperlipidemia Paternal Grandfather      Prostate Cancer Paternal Grandfather      Chemical Addiction Paternal Grandfather         Alcoholism     Diabetes Paternal Grandfather         type I caused by pancreatitis     Substance Abuse Paternal Grandfather         alcoholic     Thyroid Disease Paternal Grandfather      Other Cancer Paternal Grandfather         Lymphoma     Unknown/Adopted Maternal Grandmother      Depression/Anxiety Maternal Grandmother         Bipolar     Other Cancer Maternal Grandmother         Small Cell Carcinoma     Breast Cancer Maternal Grandmother      Mental Illness Maternal Grandmother         Bipolar Disorder     Unknown/Adopted Maternal Grandfather      Diabetes Maternal Grandfather      Breast Cancer Other      Prostate Cancer Other         Great Grandfather     Chemical Addiction Other         Alcoholism     Prostate Cancer Other      Chemical Addiction Other         Alcoholism     Substance Abuse Other         alcoholic     Other Cancer Other         Small Cell Carcinoma     Breast Cancer Other      Mental Illness Other         Bipolar Disorder     Cerebrovascular Disease Other      Substance Abuse Other         alcoholic     Prostate Cancer Other      Chemical Addiction Other         Alcoholism           Reviewed and updated as needed this visit by clinical staff  Tobacco  Allergies  Meds  Med Hx  Surg Hx  Fam Hx   "Soc Hx      Reviewed and updated as needed this visit by Provider         ROS:  Constitutional, HEENT, cardiovascular, pulmonary, gi and gu systems are negative, except as otherwise noted.    OBJECTIVE:     /70 (BP Location: Right arm, Patient Position: Sitting, Cuff Size: Adult Regular)   Pulse 98   Temp 97.5  F (36.4  C) (Oral)   Ht 1.727 m (5' 8\")   Wt 91.6 kg (202 lb)   LMP 01/28/2019   SpO2 97%   BMI 30.71 kg/m    Body mass index is 30.71 kg/m .   GENERAL: healthy, alert and no distress  NECK: no adenopathy, no asymmetry, masses, or scars and thyroid normal to palpation  RESP: lungs clear to auscultation - no rales, rhonchi or wheezes  CV: regular rate and rhythm, normal S1 S2, no S3 or S4, no murmur, click or rub, no peripheral edema and peripheral pulses strong  ABDOMEN: soft, nontender, no hepatosplenomegaly, no masses and bowel sounds normal  Comprehensive back pain exam:  Tenderness of low back paraspinal muscles bilateral R>L, Pain limits the following motions: side and forward flexion, Lower extremity strength functional and equal on both sides, Lower extremity reflexes within normal limits bilaterally, Lower extremity sensation normal and equal on both sides and Straight leg positive on  right, indicating possible ipsilateral radiculopathy    Diagnostic Test Results:  none     ASSESSMENT:       PLAN:   (M54.5,  G89.29) Chronic bilateral low back pain without sciatica  (primary encounter diagnosis)  Comment: needs to get more aggressive with PT and if not improved then consider referral. Work note written  Plan: [facility] HAND, [facility] CHIR[facility]RACTIC REFERRAL,         naproxen (NAPROSYN) 500 MG tablet, tiZANidine         (ZANAFLEX) 2 MG tablet        Follow up 2 months          Patient Instructions     [facility] ([facility])  TO SCHEDULE AN APPOINTMENT CALL:  [phone]    Orders Placed This Encounter     [facility] PT, HAND, AND CHIROPRACTIC REFERRAL     Standing Status:   Future     " Standing Expiration Date:   1/29/2020     Referral Type:   [facility]     Number of Visits Requested:   1              \    Orders Placed This Encounter     [facility] PT, HAND, AND CHIROPRACTIC REFERRAL     Standing Status:   Future     Standing Expiration Date:   1/29/2020     Referral Type:   [facility]     Number of Visits Requested:   1     DISCONTD: cyclobenzaprine (FLEXERIL) 10 MG tablet     Sig: Take 10 mg by mouth 3 times daily as needed for muscle spasms     naproxen (NAPROSYN) 500 MG tablet     Sig: Take 1 tablet (500 mg) by mouth 2 times daily (with meals)     Dispense:  60 tablet     Refill:  5     tiZANidine (ZANAFLEX) 2 MG tablet     Sig: Take 1 tablet (2 mg) by mouth 3 times daily     Dispense:  60 tablet     Refill:  3           Jr John MD, MD  [facility]

## 2019-01-29 NOTE — LETTER
[address] [address]  [phone]          January 29, 2019      RE: Milagro Ferrera                                                                       To Whom it May Concern:    Milagro Ferrera is seen at our clinic for ongoing back pain.     We have recommended physical therapy.   We also recommend working no more than 8 hour shifts and no more than 3 consecutively  She should lift no more than 40 pounds but no repetitive lifting and bending.     Our plan is to have her participate with the physical therapy for the next 2 months and then reevaluate her progress. We will review the restrictions at that time.     If no improvement then we will recommend a referral to our orthopedic specialists.          Sincerely,    Jr John MD

## 2019-02-07 ENCOUNTER — THERAPY VISIT (OUTPATIENT)
Dept: PHYSICAL THERAPY | Facility: CLINIC | Age: 34
End: 2019-02-07
Payer: COMMERCIAL

## 2019-02-07 DIAGNOSIS — M54.50 CHRONIC RIGHT-SIDED LOW BACK PAIN WITHOUT SCIATICA: ICD-10-CM

## 2019-02-07 DIAGNOSIS — M54.50 CHRONIC BILATERAL LOW BACK PAIN WITHOUT SCIATICA: ICD-10-CM

## 2019-02-07 DIAGNOSIS — G89.29 CHRONIC RIGHT-SIDED LOW BACK PAIN WITHOUT SCIATICA: ICD-10-CM

## 2019-02-07 DIAGNOSIS — G89.29 CHRONIC BILATERAL LOW BACK PAIN WITHOUT SCIATICA: ICD-10-CM

## 2019-02-07 PROCEDURE — 97110 THERAPEUTIC EXERCISES: CPT | Mod: GP | Performed by: PHYSICAL THERAPIST

## 2019-02-07 PROCEDURE — 97161 PT EVAL LOW COMPLEX 20 MIN: CPT | Mod: GP | Performed by: PHYSICAL THERAPIST

## 2019-02-07 NOTE — PROGRESS NOTES
[place] [facility] Initial Evaluation  Subjective:  The history is provided by the patient.   Milagro Ferrera is a 33 year old female with a lumbar condition.  Condition occurred with:  Repetition/overuse and lifting.  Condition occurred: for unknown reasons and at work.  This is a recurrent condition  Pain for a bout 10 years.  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  A few years later (Nov 20187) ago had a major spasm of LB.  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  Jan 2019 had another minor back spasm.  One additional spasm in Jan.  MD referral 1/29/2019..    Patient reports pain:  Lumbar spine right, lower lumbar spine and central lumbar spine.  Radiates to:  Thigh right, gluteals right and gluteals left.  Pain is described as sharp and aching and is constant and reported as 5/10.  Associated symptoms:  Loss of motion/stiffness and loss of motion. Pain is worse in the P.M..  Symptoms are exacerbated by lifting, standing and lying down (stand 10 -15 min) and relieved by NSAID's and muscle relaxants.  Since onset symptoms are unchanged.  Special tests:  X-ray (normal ).  Previous treatment: YOGA and TENS at home.    General health as reported by patient is good.  Pertinent medical history includes:  Depression, migraines/headaches, overweight and smoking.  Medical allergies: no.  Other surgeries include:  No.  Current medications:  Anti-depressants, anti-inflammatory, hormone replacement therapy and muscle relaxants.  Current occupation is ..  Patient is working in normal job with restrictions.  Primary job tasks include:  Lifting, prolonged standing and repetitive tasks.    Barriers include:  None as reported by the patient.    Red flags:  None as reported by the patient.                        Objective:  System         Lumbar/SI Evaluation  ROM:    AROM Lumbar:   Flexion:          Full with pain  Ext:                    Full   Side Bend:        Left:  Full with  pain    Right:  Decreased 25%  Rotation:           Left:     Right:   Side Glide:        Left:     Right:         Strength: fair trunk strength  Lumbar Myotomes:  normal (slight pain with resistance in LB)            Lumbar DTR's:  normal          Neural Tension/Mobility:  Neural tension wnl lumbar: tight HS bilat.      Left side:SLR or Slump  negative.     Right side:   Slump or SLR  negative.   Lumbar Palpation:      Tenderness present at Right: Quadratus Lumborum and Erector Spinae                                                     General     ROS    Assessment/Plan:    Patient is a 33 year old female with lumbar complaints.    Patient has the following significant findings with corresponding treatment plan.                Diagnosis 1:  LBP  Pain -  manual therapy, self management, education, directional preference exercise and home program  Decreased ROM/flexibility - manual therapy and therapeutic exercise  Decreased strength - therapeutic exercise and therapeutic activities  Impaired muscle performance - neuro re-education  Decreased function - therapeutic activities    Therapy Evaluation Codes:   1) History comprised of:   Personal factors that impact the plan of care:      None.    Comorbidity factors that impact the plan of care are:      Depression, Migraines/headaches, Overweight and Smoking.     Medications impacting care: None.  2) Examination of Body Systems comprised of:   Body structures and functions that impact the plan of care:      Lumbar spine.   Activity limitations that impact the plan of care are:      Lifting, Standing and Laying down.  3) Clinical presentation characteristics are:   Stable/Uncomplicated.  4) Decision-Making    Low complexity using standardized patient assessment instrument and/or measureable assessment of functional outcome.  Cumulative Therapy Evaluation is: Low complexity.    Previous and current functional limitations:  (See Goal Flow Sheet for this information)     Short term and Long term goals: (See Goal Flow Sheet for this information)     Communication ability:  Patient appears to be able to clearly communicate and understand verbal and written communication and follow directions correctly.  Treatment Explanation - The following has been discussed with the patient:   RX ordered/plan of care  Anticipated outcomes  Possible risks and side effects  This patient would benefit from PT intervention to resume normal activities.   Rehab potential is good.    Frequency:  1 X week, once daily  Duration:  for 8 weeks  Discharge Plan:  Achieve all LTG.  Independent in home treatment program.  Reach maximal therapeutic benefit.    Please refer to the daily flowsheet for treatment today, total treatment time and time spent performing 1:1 timed codes.

## 2019-02-28 ENCOUNTER — THERAPY VISIT (OUTPATIENT)
Dept: PHYSICAL THERAPY | Facility: CLINIC | Age: 34
End: 2019-02-28
Payer: COMMERCIAL

## 2019-02-28 DIAGNOSIS — G89.29 CHRONIC RIGHT-SIDED LOW BACK PAIN WITHOUT SCIATICA: ICD-10-CM

## 2019-02-28 DIAGNOSIS — M54.50 CHRONIC RIGHT-SIDED LOW BACK PAIN WITHOUT SCIATICA: ICD-10-CM

## 2019-02-28 PROCEDURE — 97140 MANUAL THERAPY 1/> REGIONS: CPT | Mod: GP | Performed by: PHYSICAL THERAPIST

## 2019-02-28 PROCEDURE — 97110 THERAPEUTIC EXERCISES: CPT | Mod: GP | Performed by: PHYSICAL THERAPIST

## 2019-03-07 ENCOUNTER — THERAPY VISIT (OUTPATIENT)
Dept: PHYSICAL THERAPY | Facility: CLINIC | Age: 34
End: 2019-03-07
Payer: COMMERCIAL

## 2019-03-07 DIAGNOSIS — G89.29 CHRONIC RIGHT-SIDED LOW BACK PAIN WITHOUT SCIATICA: ICD-10-CM

## 2019-03-07 DIAGNOSIS — M54.50 CHRONIC RIGHT-SIDED LOW BACK PAIN WITHOUT SCIATICA: ICD-10-CM

## 2019-03-07 PROCEDURE — 97110 THERAPEUTIC EXERCISES: CPT | Mod: GP | Performed by: PHYSICAL THERAPIST

## 2019-03-07 PROCEDURE — 97140 MANUAL THERAPY 1/> REGIONS: CPT | Mod: GP | Performed by: PHYSICAL THERAPIST

## 2019-03-11 ENCOUNTER — TELEPHONE (OUTPATIENT)
Dept: FAMILY MEDICINE | Facility: CLINIC | Age: 34
End: 2019-03-11

## 2019-03-11 NOTE — TELEPHONE ENCOUNTER
Reason for call:  Patient reporting a symptom    Symptom or request: Patient calling stating that she has been seeing PT. Last Thursday after PT, she was in significant pain. She was unable to go into work on Friday. She states the pain is still pretty bad. She is having difficulty getting around, she's able to move but feels she is unable to do a good portion of her job. She said today she has a stabbing pain that radiates out like a lightning bolt down to both knees. She states her pain is a 7-8 out of 10.She has been taking the muscle relaxers and pain medication as instructed. She is wondering what PCP advises. She is wondering if gabapentin would be a better option than the NSAIDs?    Duration (how long have symptoms been present): see above    Have you been treated for this before? Yes    Additional comments: Patient uses [employer]    Phone Number patient can be reached at:  Home number on file [phone] (home)    Best Time:  any    Can we leave a detailed message on this number:  YES    Call taken on 3/11/2019 at 12:30 PM by Leticia Freitas

## 2019-03-11 NOTE — TELEPHONE ENCOUNTER
Called and left detailed voice message for patient advising office visit today due to change/worsening of symptoms despite treatment measures.   Advised that appts are full here at [facility] today but could call one of the other  clinics or be seen at Urgent Care at [facility] today.    Advised call back if further questions/concerns.    Franko Campbell RN

## 2019-03-27 PROBLEM — G89.29 CHRONIC RIGHT-SIDED LOW BACK PAIN WITHOUT SCIATICA: Status: RESOLVED | Noted: 2019-02-07 | Resolved: 2019-03-27

## 2019-03-27 PROBLEM — M54.50 CHRONIC RIGHT-SIDED LOW BACK PAIN WITHOUT SCIATICA: Status: RESOLVED | Noted: 2019-02-07 | Resolved: 2019-03-27

## 2019-03-27 NOTE — PROGRESS NOTES
Subjective:  HPI                    Objective:  System    Physical Exam    General     ROS    Assessment/Plan:    DISCHARGE REPORT    Progress reporting period is from 2/7/2019 to 3/7/2019.       SUBJECTIVE  Subjective changes noted by patient:   Subjective: Pretty good lately with back.      Current pain level is  3/10.     Previous pain level was   5/10.   Changes in function:  unknown  Adverse reaction to treatment or activity: None    OBJECTIVE  Objective: Correctly demo exercises.     ASSESSMENT/PLAN  Updated problem list and treatment plan:Diagnosis 1:  LBP  Pain -  manual therapy, self management, education, directional preference exercise and home program  Decreased ROM/flexibility - manual therapy and therapeutic exercise  Decreased strength - therapeutic exercise and therapeutic activities  Impaired muscle performance - neuro re-education  Decreased function - therapeutic activities      STG/LTGs have been met or progress has been made towards goals:  unknown  Assessment of Progress: The patient has not returned to therapy. Current status is unknown.  Self Management Plans:  Patient has been instructed in a home treatment program.    Milagro continues to require the following intervention to meet STG and LTG's:  PT intervention is no longer required to meet STG/LTG.    Recommendations:  This patient would benefit from further evaluation.  Pt called and cancelled last 4 visits.  Reported increased pain.    Please refer to the daily flowsheet for treatment today, total treatment time and time spent performing 1:1 timed codes.

## 2019-04-25 ENCOUNTER — HOSPITAL ENCOUNTER (EMERGENCY)
Facility: CLINIC | Age: 34
Discharge: HOME | End: 2019-04-25
Attending: EMERGENCY MEDICINE | Admitting: EMERGENCY MEDICINE
Payer: COMMERCIAL

## 2019-04-25 VITALS
HEART RATE: 55 BPM | DIASTOLIC BLOOD PRESSURE: 61 MMHG | TEMPERATURE: 98.2 F | OXYGEN SATURATION: 100 % | RESPIRATION RATE: 16 BRPM | SYSTOLIC BLOOD PRESSURE: 105 MMHG

## 2019-04-25 DIAGNOSIS — M54.9 BACK PAIN, UNSPECIFIED BACK LOCATION, UNSPECIFIED BACK PAIN LATERALITY, UNSPECIFIED CHRONICITY: ICD-10-CM

## 2019-04-25 PROCEDURE — 99285 EMERGENCY DEPT VISIT HI MDM: CPT | Mod: Z6 | Performed by: EMERGENCY MEDICINE

## 2019-04-25 PROCEDURE — 99285 EMERGENCY DEPT VISIT HI MDM: CPT | Performed by: EMERGENCY MEDICINE

## 2019-04-25 PROCEDURE — 25000128 H RX IP 250 OP 636: Performed by: EMERGENCY MEDICINE

## 2019-04-25 PROCEDURE — 25000132 ZZH RX MED GY IP 250 OP 250 PS 637: Performed by: EMERGENCY MEDICINE

## 2019-04-25 PROCEDURE — 96372 THER/PROPH/DIAG INJ SC/IM: CPT | Performed by: EMERGENCY MEDICINE

## 2019-04-25 PROCEDURE — 99285 EMERGENCY DEPT VISIT HI MDM: CPT | Mod: 25 | Performed by: EMERGENCY MEDICINE

## 2019-04-25 RX ORDER — HYDROMORPHONE HYDROCHLORIDE 1 MG/ML
0.5 INJECTION, SOLUTION INTRAMUSCULAR; INTRAVENOUS; SUBCUTANEOUS ONCE
Status: COMPLETED | OUTPATIENT
Start: 2019-04-25 | End: 2019-04-25

## 2019-04-25 RX ORDER — CYCLOBENZAPRINE HCL 5 MG
10 TABLET ORAL ONCE
Status: COMPLETED | OUTPATIENT
Start: 2019-04-25 | End: 2019-04-25

## 2019-04-25 RX ORDER — HYDROMORPHONE HYDROCHLORIDE 1 MG/ML
0.5 INJECTION, SOLUTION INTRAMUSCULAR; INTRAVENOUS; SUBCUTANEOUS ONCE
Status: DISCONTINUED | OUTPATIENT
Start: 2019-04-25 | End: 2019-04-25

## 2019-04-25 RX ADMIN — CYCLOBENZAPRINE HYDROCHLORIDE 10 MG: 5 TABLET, FILM COATED ORAL at 11:33

## 2019-04-25 RX ADMIN — Medication 0.5 MG: at 11:09

## 2019-04-25 NOTE — LETTER
April 25, 2019      To Whom It May Concern:      Milagro Ferrera was seen in our Emergency Department today, 04/25/19.  I expect her condition to improve over the next 2 days.  She may return to work/school when improved.    Sincerely,        Leif Rodgers MD

## 2019-04-25 NOTE — ED AVS SNAPSHOT
[facility], [place], Emergency Department  [address] [place] [address]  Phone:  [phone]                                    Milagro Ferrera   MRN: [id number]    Department:  [facility], Emergency Department   Date of Visit:  4/25/2019           After Visit Summary Signature Page    I have received my discharge instructions, and my questions have been answered. I have discussed any challenges I see with this plan with the nurse or doctor.    ..........................................................................................................................................  Patient/Patient Representative Signature      ..........................................................................................................................................  Patient Representative Print Name and Relationship to Patient    ..................................................               ................................................  Date                                   Time    ..........................................................................................................................................  Reviewed by Signature/Title    ...................................................              ..............................................  Date                                               Time          22EPIC Rev 08/18

## 2019-04-25 NOTE — ED TRIAGE NOTES
"Pt BIBA for back pain. Pt states she has had back pain off and on for 10 years. For the past 1.5yrs pain has been increasing. Pt has seen her PCP (in Jan) who prescribed naproxen, zanaflex, and physical therapy. Next step is to see an ortho MD but an appt has not been made.     This morning patient woke up and felt bilateral hip and low back pain. She was able to ambulate to the bathroom but when she sat down unbearable \"9/10\" pain and spasms started. Pt called 911 at that time.    In the ER, pt is VSS. Pt ambulated from EMS stretcher to bed. Pt has spasms every few minutes. Pt has not taken any medication for this today. Pt offered heat/ice packs in ER.   "

## 2019-04-25 NOTE — ED PROVIDER NOTES
[place] EMERGENCY DEPARTMENT ([facility])  4/25/19   History     Chief Complaint   Patient presents with     Back Pain     The history is provided by the patient and medical records.     Milagro Ferrera is a 33 year old female who has a PMHx of chronic bilateral low back pain who presents to the Emergency Department via EMS for evaluation of bilateral hip and spasmic low back pain.  The patient reports that she has had low back pain for 10 years which worsened in the last two years.  Patient reports that last night she had some back pain while attempting to build a dresser. She went to sleep and woke up at 07:00 to unbearable lumbar back and bilateral hip pain which made it difficult for her to ambulate. She states that she has had similar acute episodes in the past which sometimes resolves spontaneously so she attempted to wait it out. At 09:00, she states that she ambulated to the bathroom when she started to have spasms of her hip muscles and lower back. This prompted her to call EMS.    Here in the ED, patient reports that her baseline back pain is 7-8/10.  She states that the leg spasms or new symptoms were. She denies any shooting pain down her leg and has no pain below her thigh. She was able to ambulate into the ED.  She states that she has not taken anything for pain management today but normally takes naproxen but also Zanaflex as needed.  Patient denies difficulty with bowel or bladder control.  She denies paresthesias or weakness.  Abdominal pain or vaginal bleeding.  The patient denies any fever.  She denies IV drug use.  She denies any recent accidents or trauma.  When asked what may have caused her chronic back pain, she states that she has done heavy lifting throughout her career. She currently works as a vet technician but states she previously worked at [email or web address] where she used to lift pallets all day. Patient has seen her PCP (in Jan) who prescribed naproxen, zanaflex, and physical  therapy. The next step was to see an Orthopaedics but an appointment has not been made.  Patient also reports that she was recently evaluated for rheumatoid arthritis she has a family history of this.  She denies having had an MRI but states that this was recommended in the past by her provider, however, she was unable to have this done due to insurance/financial issues.    RECENT IMAGING:    LUMBAR SPINE TWO TO THREE VIEWS  9/26/2018 2:32 PM   COMPARISON: None.                                                            IMPRESSION: Lumbar vertebrae are normally aligned. No compression  deformity or acute fracture.     I have reviewed the Medications, Allergies, Past Medical and Surgical History, and Social History in the [facility] system.    Past Medical History:   Diagnosis Date     Coronary artery disease 2010    usually caused from anxiety     Depressive disorder 2004    seen by 3 different psychs, no conclusive diagnosis       Past Surgical History:   Procedure Laterality Date     COSMETIC SURGERY  late 90s    mole removal: groin area and neck       Family History   Problem Relation Age of Onset     Unknown/Adopted Mother      Other Cancer Mother         cervical, melanoma, small cell carcinoma     Depression/Anxiety Mother         Bipolar     Depression Mother         Bipolar Disorder     Mental Illness Mother         bi-polar     Breast Cancer Mother      Cancer Father 44        brain     Other Cancer Father         Glioblastoma     Depression/Anxiety Father         PTSD     Chemical Addiction Father         Marijuana     Depression Father      Mental Illness Father         ptsd, depression (untreated)     Asthma Father         childhood     Anxiety Disorder Father         PTSD     Substance Abuse Father         Marijuana     Diabetes Paternal Grandmother         type II caused by obesity     Other Cancer Paternal Grandmother         Osteosarcoma     Mental Illness Paternal Grandmother         Dementia      Osteoporosis Paternal Grandmother      Heart Disease Paternal Grandfather      Coronary Artery Disease Paternal Grandfather      Hypertension Paternal Grandfather      Hyperlipidemia Paternal Grandfather      Prostate Cancer Paternal Grandfather      Chemical Addiction Paternal Grandfather         Alcoholism     Diabetes Paternal Grandfather         type I caused by pancreatitis     Substance Abuse Paternal Grandfather         alcoholic     Thyroid Disease Paternal Grandfather      Other Cancer Paternal Grandfather         Lymphoma     Unknown/Adopted Maternal Grandmother      Depression/Anxiety Maternal Grandmother         Bipolar     Other Cancer Maternal Grandmother         Small Cell Carcinoma     Breast Cancer Maternal Grandmother      Mental Illness Maternal Grandmother         Bipolar Disorder     Unknown/Adopted Maternal Grandfather      Diabetes Maternal Grandfather      Breast Cancer Other      Prostate Cancer Other         Great Grandfather     Chemical Addiction Other         Alcoholism     Prostate Cancer Other      Chemical Addiction Other         Alcoholism     Substance Abuse Other         alcoholic     Other Cancer Other         Small Cell Carcinoma     Breast Cancer Other      Mental Illness Other         Bipolar Disorder     Cerebrovascular Disease Other      Substance Abuse Other         alcoholic     Prostate Cancer Other      Chemical Addiction Other         Alcoholism       Social History     Tobacco Use     Smoking status: Current Every Day Smoker     Packs/day: 0.10     Years: 1.00     Pack years: 0.10     Types: Other     Start date: 2007     Last attempt to quit: 2017     Years since quittin.2     Smokeless tobacco: Current User     Tobacco comment: social smoker smoked 1-2 cigarettes/month   Substance Use Topics     Alcohol use: No     Frequency: Never     Comment: rare       No current facility-administered medications for this encounter.      Current Outpatient Medications    Medication     levonorgestrel-ethinyl estradiol (AVIANE,ALESSE,LESSINA) 0.1-20 MG-MCG per tablet     naproxen (NAPROSYN) 500 MG tablet     nortriptyline (PAMELOR) 10 MG capsule     sertraline (ZOLOFT) 50 MG tablet     tiZANidine (ZANAFLEX) 2 MG tablet      No Known Allergies     Review of Systems   All other systems reviewed and are negative.      Physical Exam   BP: 115/85  Heart Rate: 69  Temp: 98.2  F (36.8  C)  Resp: 22  SpO2: 100 %      Physical Exam   Constitutional: She is oriented to person, place, and time. No distress.   HENT:   Head: Normocephalic and atraumatic.   Eyes: Pupils are equal, round, and reactive to light. Conjunctivae are normal.   Neck: Normal range of motion. Neck supple.   Cardiovascular: Normal rate and intact distal pulses.   Pulmonary/Chest: Effort normal. No respiratory distress. She has no wheezes. She has no rales. She exhibits no tenderness.   Abdominal: Soft. There is no tenderness. There is no guarding.   Musculoskeletal: Normal range of motion.   Neurological: She is alert and oriented to person, place, and time. She displays normal reflexes. No sensory deficit. She exhibits normal muscle tone.   Skin: Skin is warm and dry. She is not diaphoretic.   Psychiatric: She has a normal mood and affect. Her behavior is normal. Thought content normal.   Nursing note and vitals reviewed.      ED Course   10:51 AM  The patient was seen and examined by Leif Rodgers MD in Room [facility].       Procedures             Critical Care time:  none             Labs Ordered and Resulted from Time of ED Arrival Up to the Time of Departure from the ED - No data to display         Assessments & Plan (with Medical Decision Making)   34 yo F who presents with recurrent back pain while building a dresser.  Her neurologic exam is normal and there is no evidence of cord compression.  She has had a lumbar xray which was normal but has not had an MRI. She felt improved after IM dilaudid.  I recommended she  follow up with her PCP to discuss obtaining an MRI if continued pain.     I have reviewed the nursing notes.    I have reviewed the findings, diagnosis, plan and need for follow up with the patient.       Medication List      There are no discharge medications for this visit.         Final diagnoses:   None     I, Igor Hewitt, am serving as a trained medical scribe to document services personally performed by Leif Rodgers MD, based on the provider's statements to me.   ILeif MD, was physically present and have reviewed and verified the accuracy of this note documented by Igor Hewitt.     4/25/2019   [facility], [place], EMERGENCY DEPARTMENT     Leif Rodgers MD  05/06/19 0733       Leif Rodgers MD  05/10/19 0927

## 2019-05-02 ENCOUNTER — PRE VISIT (OUTPATIENT)
Dept: ORTHOPEDICS | Facility: CLINIC | Age: 34
End: 2019-05-02

## 2019-05-02 NOTE — TELEPHONE ENCOUNTER
RECORDS RECEIVED FROM:   being seen for additional diagnostics and recommendations for chronic low back pain as referral by my primary provider Priti John MD.    Made On: 4/29/2019 1:37 PM By: AMA BRYANT [[id number]] (Pt Web)        DATE RECEIVED: 5/2   NOTES STATUS DETAILS   OFFICE NOTE from referring provider Internal    OFFICE NOTE from other specialist N/A    DISCHARGE SUMMARY from hospital N/A    DISCHARGE REPORT from the ER Internal    OPERATIVE REPORT N/A    MEDICATION LIST Internal    IMPLANT RECORD/STICKER N/A    LABS     CBC/DIFF N/A    CULTURES N/A    INJECTIONS DONE IN RADIOLOGY N/A    MRI N/A    CT SCAN N/A    XRAYS (IMAGES & REPORTS) Internal    TUMOR     PATHOLOGY  Slides & report N/A

## 2019-05-08 ENCOUNTER — OFFICE VISIT (OUTPATIENT)
Dept: ORTHOPEDICS | Facility: CLINIC | Age: 34
End: 2019-05-08
Payer: COMMERCIAL

## 2019-05-08 VITALS — WEIGHT: 200.2 LBS | BODY MASS INDEX: 30.34 KG/M2 | HEIGHT: 68 IN

## 2019-05-08 DIAGNOSIS — M54.50 BILATERAL LOW BACK PAIN WITHOUT SCIATICA, UNSPECIFIED CHRONICITY: Primary | ICD-10-CM

## 2019-05-08 RX ORDER — IBUPROFEN 200 MG
800 TABLET ORAL DAILY PRN
COMMUNITY
End: 2020-12-30

## 2019-05-08 ASSESSMENT — MIFFLIN-ST. JEOR: SCORE: 1671.6

## 2019-05-08 ASSESSMENT — PAIN SCALES - GENERAL: PAINLEVEL: SEVERE PAIN (6)

## 2019-05-08 NOTE — LETTER
5/8/2019      RE: Milagro Ferrera  [address] [place] [address] [address]  [facility] [id number]        Subjective:   Milagro Ferrera is a 33 year old female who presents with bilateral low back pain that radiates down both lateral thighs. R > L. The patient reports straining her back when she worked at [employer] in 2007 approximately 12 years ago, pallate of joyn litter into bin. No MVA.  She had been working full time performing heavy lifting tasks. She started getting episodic major back spasms the past 2 years. She is a  for a living.   October PMD, reduced hours at work, 12 hour shifts in a row.  8 hours, manager ignored this.  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  Muscle relaxers didn't help.  Two months of PT, then ortho.  Strict 8 hours, 3 days in a row max.  Naproxen, Tylenol daily pain meds regimen.  Back spasms- more damage to back?  Had to go to ER, thigh muscles contracted and she couldn't move.  Lumbar to coccyx inflamed and pulsing spasms, contractions 20-30 seconds.  Lasted until later that night, couldn't sit.  Wed went to work, Thursday took off.  Been able to do her job better.  No family issues of back issues.  Grandma possible RA?  Had osteosarcoma, bone cancer.  Right > left, sometimes midline.  Masses on her spine.  Shooting pain through hips.  CORE strengthening throughout the CORE.  No exercise routine, in too much pain.  Anything can make it come on- 10 minutes- hips on fire.  Dog lift at work, lifting with her legs.  Weird bending and manuevering, more painful.  Feels like hip will dislocate.  No incontinence, pain with stab and radiation, pulseitile.  Extension is a problem  Background:   Date of injury: 12 years ago  Duration of symptoms: 12 years  Mechanism of Injury: Chronic  Intensity: 6-8/10  Aggravating factors: Trunk rotation, standing >10 minutes, sitting > 30 minutes    Relieving Factors: Ibuprofen. PT and naproxen didn't help.  Prior Evaluation: PCP, ED, xrays    PAST  MEDICAL, SOCIAL, SURGICAL AND FAMILY HISTORY: She  has a past medical history of Coronary artery disease (2010) and Depressive disorder (2004). She also has no past medical history of Arthritis, Cancer (H), Cerebral infarction (H), Congestive heart failure (H), Congestive heart failure, unspecified, COPD (chronic obstructive pulmonary disease) (H), CVA (cerebral infarction), Diabetes (H), History of blood transfusion, Hypertension, Thyroid disease, or Uncomplicated asthma.  She  has a past surgical history that includes Cosmetic surgery (late 90s).  Her family history includes Anxiety Disorder in her father; Asthma in her father; Breast Cancer in her maternal grandmother, mother, and other family members; Cancer (age of onset: 44) in her father; Cerebrovascular Disease in an other family member; Chemical Addiction in her father, paternal grandfather, and other family members; Coronary Artery Disease in her paternal grandfather; Depression in her father and mother; Depression/Anxiety in her father, maternal grandmother, and mother; Diabetes in her maternal grandfather, paternal grandfather, and paternal grandmother; Heart Disease in her paternal grandfather; Hyperlipidemia in her paternal grandfather; Hypertension in her paternal grandfather; Mental Illness in her father, maternal grandmother, mother, paternal grandmother, and another family member; Osteoporosis in her paternal grandmother; Other Cancer in her father, maternal grandmother, mother, paternal grandfather, paternal grandmother, and another family member; Prostate Cancer in her paternal grandfather and other family members; Substance Abuse in her father, paternal grandfather, and other family members; Thyroid Disease in her paternal grandfather; Unknown/Adopted in her maternal grandfather, maternal grandmother, and mother.  She reports that she has been smoking other.  She started smoking about 12 years ago. She has a 0.10 pack-year smoking history. She  "uses smokeless tobacco. She reports that she does not drink alcohol or use drugs.    ALLERGIES: She has No Known Allergies.    CURRENT MEDICATIONS: She has a current medication list which includes the following prescription(s): ibuprofen, levonorgestrel-ethinyl estradiol, nortriptyline, sertraline, tizanidine, and naproxen.     REVIEW OF SYSTEMS: 10 point review of systems is negative except as noted above.     Exam:   Ht 1.727 m (5' 8\")   Wt 90.8 kg (200 lb 3.2 oz)   LMP 04/26/2019 (Exact Date)   BMI 30.44 kg/m              CONSTITUTIONAL: alert, no distress and obese  HEAD: Normocephalic. No masses, lesions, tenderness or abnormalities  SKIN: no suspicious lesions or rashes  GAIT: normal  NEUROLOGIC: Non-focal, Normal muscle tone and strength, reflexes normal, sensation grossly normal.  PSYCHIATRIC: affect normal/bright and mentation appears normal.    MUSCULOSKELETAL: LBP    Tender:  left para lumbar muscles, right para lumbar muscles, midline low back  Non-tender:  thoracic spinous processes, left parathoracic muscles, right parathoracic muscles  Range of Motion:  lumbar flexion  decreased, lumbar extension  decreased, painful  Strength:  able to heel walk, able to toe walk  Special tests:  negative straight leg raises    Hip Exam: Hip ROM full, -KOLE, -FADIR     Assessment/Plan:   Pt is a 32 yo obese white female with PMhx of depression, ADHD presenting with chronic midline low back pain   1. Chronic midline low back pain  MRI lumbar  Pool therapy  Weakness hip abductors  Pt wants to try acupuncture and chiropractic- had discussion, insurance likely won't pay, still needs strengthening exercises    RTC 8 weeks, post imaging    X-RAY INTERPRETATION:   Lumbar x-ray  IMPRESSION: Lumbar vertebrae are normally aligned. No compression deformity or acute fracture.      Irina Beltran MD    "

## 2019-05-08 NOTE — PROGRESS NOTES
Subjective:   Milagro Ferrera is a 33 year old female who presents with bilateral low back pain that radiates down both lateral thighs. R > L. The patient reports straining her back when she worked at [employer] in 2007 approximately 12 years ago, pallate of jony litter into bin. No MVA.  She had been working full time performing heavy lifting tasks. She started getting episodic major back spasms the past 2 years. She is a  for a living.   October PMD, reduced hours at work, 12 hour shifts in a row.  8 hours, manager ignored this.  Shaq was restraining a dog, back spasms.  Muscle relaxers didn't help.  Two months of PT, then ortho.  Strict 8 hours, 3 days in a row max.  Naproxen, Tylenol daily pain meds regimen.  Back spasms- more damage to back?  Had to go to ER, thigh muscles contracted and she couldn't move.  Lumbar to coccyx inflamed and pulsing spasms, contractions 20-30 seconds.  Lasted until later that night, couldn't sit.  Wed went to work, Thursday took off.  Been able to do her job better.  No family issues of back issues.  Grandma possible RA?  Had osteosarcoma, bone cancer.  Right > left, sometimes midline.  Masses on her spine.  Shooting pain through hips.  CORE strengthening throughout the CORE.  No exercise routine, in too much pain.  Anything can make it come on- 10 minutes- hips on fire.  Dog lift at work, lifting with her legs.  Weird bending and manuevering, more painful.  Feels like hip will dislocate.  No incontinence, pain with stab and radiation, pulseitile.  Extension is a problem  Background:   Date of injury: 12 years ago  Duration of symptoms: 12 years  Mechanism of Injury: Chronic  Intensity: 6-8/10  Aggravating factors: Trunk rotation, standing >10 minutes, sitting > 30 minutes    Relieving Factors: Ibuprofen. PT and naproxen didn't help.  Prior Evaluation: PCP, ED, xrays    PAST MEDICAL, SOCIAL, SURGICAL AND FAMILY HISTORY: She  has a past medical history of Coronary artery disease  (2010) and Depressive disorder (2004). She also has no past medical history of Arthritis, Cancer (H), Cerebral infarction (H), Congestive heart failure (H), Congestive heart failure, unspecified, COPD (chronic obstructive pulmonary disease) (H), CVA (cerebral infarction), Diabetes (H), History of blood transfusion, Hypertension, Thyroid disease, or Uncomplicated asthma.  She  has a past surgical history that includes Cosmetic surgery (late 90s).  Her family history includes Anxiety Disorder in her father; Asthma in her father; Breast Cancer in her maternal grandmother, mother, and other family members; Cancer (age of onset: 44) in her father; Cerebrovascular Disease in an other family member; Chemical Addiction in her father, paternal grandfather, and other family members; Coronary Artery Disease in her paternal grandfather; Depression in her father and mother; Depression/Anxiety in her father, maternal grandmother, and mother; Diabetes in her maternal grandfather, paternal grandfather, and paternal grandmother; Heart Disease in her paternal grandfather; Hyperlipidemia in her paternal grandfather; Hypertension in her paternal grandfather; Mental Illness in her father, maternal grandmother, mother, paternal grandmother, and another family member; Osteoporosis in her paternal grandmother; Other Cancer in her father, maternal grandmother, mother, paternal grandfather, paternal grandmother, and another family member; Prostate Cancer in her paternal grandfather and other family members; Substance Abuse in her father, paternal grandfather, and other family members; Thyroid Disease in her paternal grandfather; Unknown/Adopted in her maternal grandfather, maternal grandmother, and mother.  She reports that she has been smoking other.  She started smoking about 12 years ago. She has a 0.10 pack-year smoking history. She uses smokeless tobacco. She reports that she does not drink alcohol or use drugs.    ALLERGIES: She has No  "Known Allergies.    CURRENT MEDICATIONS: She has a current medication list which includes the following prescription(s): ibuprofen, levonorgestrel-ethinyl estradiol, nortriptyline, sertraline, tizanidine, and naproxen.     REVIEW OF SYSTEMS: 10 point review of systems is negative except as noted above.     Exam:   Ht 1.727 m (5' 8\")   Wt 90.8 kg (200 lb 3.2 oz)   LMP 04/26/2019 (Exact Date)   BMI 30.44 kg/m             CONSTITUTIONAL: alert, no distress and obese  HEAD: Normocephalic. No masses, lesions, tenderness or abnormalities  SKIN: no suspicious lesions or rashes  GAIT: normal  NEUROLOGIC: Non-focal, Normal muscle tone and strength, reflexes normal, sensation grossly normal.  PSYCHIATRIC: affect normal/bright and mentation appears normal.    MUSCULOSKELETAL: LBP    Tender:  left para lumbar muscles, right para lumbar muscles, midline low back  Non-tender:  thoracic spinous processes, left parathoracic muscles, right parathoracic muscles  Range of Motion:  lumbar flexion  decreased, lumbar extension  decreased, painful  Strength:  able to heel walk, able to toe walk  Special tests:  negative straight leg raises    Hip Exam: Hip ROM full, -KOLE, -FADIR     Assessment/Plan:   Pt is a 32 yo obese white female with PMhx of depression, ADHD presenting with chronic midline low back pain   1. Chronic midline low back pain  MRI lumbar  Pool therapy  Weakness hip abductors  Pt wants to try acupuncture and chiropractic- had discussion, insurance likely won't pay, still needs strengthening exercises    RTC 8 weeks, post imaging    X-RAY INTERPRETATION:   Lumbar x-ray  IMPRESSION: Lumbar vertebrae are normally aligned. No compression  deformity or acute fracture.  "

## 2019-05-09 ENCOUNTER — ANCILLARY PROCEDURE (OUTPATIENT)
Dept: MRI IMAGING | Facility: CLINIC | Age: 34
End: 2019-05-09
Attending: FAMILY MEDICINE
Payer: COMMERCIAL

## 2019-05-09 DIAGNOSIS — M54.50 BILATERAL LOW BACK PAIN WITHOUT SCIATICA, UNSPECIFIED CHRONICITY: ICD-10-CM

## 2019-05-10 ENCOUNTER — TELEPHONE (OUTPATIENT)
Dept: FAMILY MEDICINE | Facility: CLINIC | Age: 34
End: 2019-05-10

## 2019-05-10 NOTE — TELEPHONE ENCOUNTER
Reason for Call:  Other call back    Detailed comments: Patient saw Dr. John for her back pain on 1/29 and he wrote her a work restrictions letter. He referred her to PT, and ortho if the pt didn't help. Patient is now seeing Dr. Beltran in the Ortho, and she had an MRI on 5/9 (results should be back on Monday). Patient was in the ER on 4/25 for her back pain, and she has a follow up with ortho at the end of the month.     Patient is needing an updated restrictions letter for work, and is wondering if this should come from Dr. John or Dr. Beltran. Please call patient to advise, I believe she would need a f/u with Dr. John for an updated letter, but I could be wrong.    Phone Number Patient can be reached at: Home number on file [phone] (home)    Best Time: anytime    Can we leave a detailed message on this number? YES    Call taken on 5/10/2019 at 11:36 AM by Ronald Jean Baptiste

## 2019-05-14 NOTE — TELEPHONE ENCOUNTER
If Dr Baltazar has new information and a recent evaluation she could get it from him otherwise she should set up a follow up appt to review.

## 2019-05-14 NOTE — TELEPHONE ENCOUNTER
Patient notified and is agreeable. She saw Dr. Beltran recently, so will reach out to them for a letter.  Nothing further needed at this time.    Fiona Myers RN

## 2019-05-14 NOTE — TELEPHONE ENCOUNTER
To provider to advise on below message.  Should work restriction letter be from you or from ortho?    Tj Izaguirre RN

## 2019-05-24 ENCOUNTER — OFFICE VISIT (OUTPATIENT)
Dept: ORTHOPEDICS | Facility: CLINIC | Age: 34
End: 2019-05-24
Payer: COMMERCIAL

## 2019-05-24 VITALS — WEIGHT: 200 LBS | BODY MASS INDEX: 30.31 KG/M2 | HEIGHT: 68 IN

## 2019-05-24 DIAGNOSIS — G89.29 CHRONIC MIDLINE LOW BACK PAIN WITHOUT SCIATICA: Primary | ICD-10-CM

## 2019-05-24 DIAGNOSIS — M54.50 CHRONIC MIDLINE LOW BACK PAIN WITHOUT SCIATICA: Primary | ICD-10-CM

## 2019-05-24 ASSESSMENT — MIFFLIN-ST. JEOR: SCORE: 1670.69

## 2019-05-24 ASSESSMENT — PAIN SCALES - GENERAL: PAINLEVEL: MILD PAIN (3)

## 2019-05-24 NOTE — LETTER
May 24, 2019    RE:Milagro Ferrera  [address] [place] [address] W   [facility] [id number]    1985            Dear Ms. Ferrera      To Whom It May Concern:    Milagro Ferrera is under my professional care for Chronic midline low back pain without sciatica.   She  may return to work on or about 5/28/19 with the following: Light duty-two 8 hour shifts and one 12 hour shift weekly with 35 lb weight restriction.        Sincerely,      Irina Beltran MD

## 2019-05-24 NOTE — PROGRESS NOTES
Subjective:   Milagro Ferrera is a 33 year old female who is f/u for her lumbar MRI.  Working out at home.  No chance for pool therapy yet.  Pain meds and muscle relaxers.  Doesn't come home in pain.  Lifting a dog, bringing dog close and then standing up.  Dog flailing is an issue.  No numbness or tingling.  No incontinence.  Occasionally there is pain, sometimes through the hips.  Through the hip canal is what it feels like.  Right L4  Tightness in back and shoulder, bad posture  Pt denies leg pain or any radiculopathy  Schedule is a nightmare, doesn't have next week schedule, needs to work.  3 days a week, 35 lbs, 8 hour shifts    Date last seen: 5/8/2019  Interval History: MRI    PAST MEDICAL, SOCIAL, SURGICAL AND FAMILY HISTORY: She  has a past medical history of Coronary artery disease (2010) and Depressive disorder (2004). She also has no past medical history of Arthritis, Cancer (H), Cerebral infarction (H), Congestive heart failure (H), Congestive heart failure, unspecified, COPD (chronic obstructive pulmonary disease) (H), CVA (cerebral infarction), Diabetes (H), History of blood transfusion, Hypertension, Thyroid disease, or Uncomplicated asthma.  She  has a past surgical history that includes Cosmetic surgery (late 90s).  Her family history includes Anxiety Disorder in her father; Asthma in her father; Breast Cancer in her maternal grandmother, mother, and other family members; Cancer (age of onset: 44) in her father; Cerebrovascular Disease in an other family member; Chemical Addiction in her father, paternal grandfather, and other family members; Coronary Artery Disease in her paternal grandfather; Depression in her father and mother; Depression/Anxiety in her father, maternal grandmother, and mother; Diabetes in her maternal grandfather, paternal grandfather, and paternal grandmother; Heart Disease in her paternal grandfather; Hyperlipidemia in her paternal grandfather; Hypertension in her paternal  grandfather; Mental Illness in her father, maternal grandmother, mother, paternal grandmother, and another family member; Osteoporosis in her paternal grandmother; Other Cancer in her father, maternal grandmother, mother, paternal grandfather, paternal grandmother, and another family member; Prostate Cancer in her paternal grandfather and other family members; Substance Abuse in her father, paternal grandfather, and other family members; Thyroid Disease in her paternal grandfather; Unknown/Adopted in her maternal grandfather, maternal grandmother, and mother.  She reports that she has been smoking other.  She started smoking about 12 years ago. She has a 0.10 pack-year smoking history. She uses smokeless tobacco. She reports that she does not drink alcohol or use drugs.    ALLERGIES: She has No Known Allergies.    CURRENT MEDICATIONS: She has a current medication list which includes the following prescription(s): ibuprofen, levonorgestrel-ethinyl estradiol, naproxen, nortriptyline, sertraline, and tizanidine.     REVIEW OF SYSTEMS: 10 point review of systems is negative except as noted above.     Exam:   [facility] 04/26/2019 (Exact Date)            CONSTITUTIONAL: healthy, alert, no distress and cooperative  HEAD: Normocephalic. No masses, lesions, tenderness or abnormalities  SKIN: no suspicious lesions or rashes  GAIT: normal  NEUROLOGIC: Non-focal  PSYCHIATRIC: affect normal/bright and mentation appears normal.    MUSCULOSKELETAL: low back pain, more on the right  Tender:  right para lumbar muscles  Non-tender:  thoracic spinous processes, left parathoracic muscles, right parathoracic muscles, lumbar spinous processes, left para lumbar muscles  Range of Motion:  lumbar flexion  decreased, lumbar extension  full  Strength:  able to heel walk, able to toe walk  Special tests:  negative straight leg raises    Hip Exam: Hip ROM full       Assessment/Plan:   Pt is a 34 yo white female with PMhx of depression, migraine  presenting with right sided LBP, L4    1. Right sided LBP, L4- discussed MRI  Pursue strengthening, pool therapy encouraged  Consider injection if worsening    RTC 8 weeks    X-RAY INTERPRETATION:   MRI lumbar  Impression: Central disc extrusion at L4-5 with mild spinal canal  narrowing, lateral recess narrowing, and neural foraminal narrowing.

## 2019-09-09 ENCOUNTER — TELEPHONE (OUTPATIENT)
Dept: FAMILY MEDICINE | Facility: CLINIC | Age: 34
End: 2019-09-09

## 2019-09-09 NOTE — TELEPHONE ENCOUNTER
Panel Management Review      Patient has the following on her problem list:     Depression / Dysthymia review    Measure:  Needs PHQ-9 score of 4 or less during index window.  Administer PHQ-9 and if score is 5 or more, send encounter to provider for next steps.    5 - 7 month window range: 5/30-9/27    PHQ-9 SCORE 9/20/2017 2/8/2018 1/29/2019   PHQ-9 Total Score - - -   PHQ-9 Total Score 18 4 11       If PHQ-9 recheck is 5 or more, route to provider for next steps.    Patient is due for:  PHQ9      Composite cancer screening  Chart review shows that this patient is due/due soon for the following Pap Smear  Summary:    Patient is due/failing the following:   PAP, PHQ9 and PHYSICAL    Action needed:   Patient needs office visit for preventive care with pap. and Patient needs to do PHQ9.    Type of outreach:    Sent [employer] message.    Questions for provider review:    None                                                                                                                                    Sunita Alejandre,        Chart routed to Care Team .

## 2019-09-17 NOTE — TELEPHONE ENCOUNTER
Panel Management Review      Patient has the following on her problem list: see below      Composite cancer screening  Chart review shows that this patient is due/due soon for the following see below  Summary:    Patient is due/failing the following:   See below    Action needed:   See below    Type of outreach:    Sent letter.    Questions for provider review:    None                                                                                                                                    Karon Andrade, Certified Medical Assistant (AAMA)      Chart routed to closed .

## 2019-09-29 ENCOUNTER — HEALTH MAINTENANCE LETTER (OUTPATIENT)
Age: 34
End: 2019-09-29

## 2019-12-16 ENCOUNTER — OFFICE VISIT (OUTPATIENT)
Dept: FAMILY MEDICINE | Facility: CLINIC | Age: 34
End: 2019-12-16
Payer: COMMERCIAL

## 2019-12-16 VITALS
TEMPERATURE: 98.3 F | SYSTOLIC BLOOD PRESSURE: 122 MMHG | OXYGEN SATURATION: 97 % | DIASTOLIC BLOOD PRESSURE: 68 MMHG | HEART RATE: 84 BPM | WEIGHT: 203.8 LBS | RESPIRATION RATE: 21 BRPM | HEIGHT: 68 IN | BODY MASS INDEX: 30.89 KG/M2

## 2019-12-16 DIAGNOSIS — M54.50 CHRONIC BILATERAL LOW BACK PAIN WITHOUT SCIATICA: ICD-10-CM

## 2019-12-16 DIAGNOSIS — F90.0 ATTENTION DEFICIT HYPERACTIVITY DISORDER (ADHD), PREDOMINANTLY INATTENTIVE TYPE: Primary | ICD-10-CM

## 2019-12-16 DIAGNOSIS — F41.1 GAD (GENERALIZED ANXIETY DISORDER): ICD-10-CM

## 2019-12-16 DIAGNOSIS — G89.29 CHRONIC BILATERAL LOW BACK PAIN WITHOUT SCIATICA: ICD-10-CM

## 2019-12-16 PROCEDURE — 99214 OFFICE O/P EST MOD 30 MIN: CPT | Performed by: FAMILY MEDICINE

## 2019-12-16 RX ORDER — NAPROXEN 500 MG/1
500 TABLET ORAL 2 TIMES DAILY WITH MEALS
Qty: 60 TABLET | Refills: 5 | Status: SHIPPED | OUTPATIENT
Start: 2019-12-16 | End: 2021-04-12

## 2019-12-16 RX ORDER — METHYLPHENIDATE HYDROCHLORIDE 20 MG/1
20 CAPSULE, EXTENDED RELEASE ORAL DAILY
Qty: 30 CAPSULE | Refills: 0 | Status: SHIPPED | OUTPATIENT
Start: 2019-12-16 | End: 2020-03-09

## 2019-12-16 RX ORDER — SERTRALINE HYDROCHLORIDE 100 MG/1
100 TABLET, FILM COATED ORAL DAILY
Qty: 90 TABLET | Refills: 1 | Status: SHIPPED | OUTPATIENT
Start: 2019-12-16 | End: 2020-08-06

## 2019-12-16 RX ORDER — METHYLPHENIDATE HYDROCHLORIDE 20 MG/1
20 CAPSULE, EXTENDED RELEASE ORAL DAILY
Qty: 30 CAPSULE | Refills: 0 | Status: SHIPPED | OUTPATIENT
Start: 2020-01-16 | End: 2020-03-09

## 2019-12-16 RX ORDER — ACETAMINOPHEN 500 MG
1000 TABLET ORAL PRN
COMMUNITY
Start: 2018-11-01

## 2019-12-16 RX ORDER — METHYLPHENIDATE HYDROCHLORIDE 20 MG/1
20 CAPSULE, EXTENDED RELEASE ORAL DAILY
Qty: 30 CAPSULE | Refills: 0 | Status: SHIPPED | OUTPATIENT
Start: 2020-02-16 | End: 2020-03-04

## 2019-12-16 ASSESSMENT — MIFFLIN-ST. JEOR: SCORE: 1682.93

## 2019-12-16 NOTE — PROGRESS NOTES
Subjective     Milagro Ferrera is a 34 year old female who presents to clinic today for the following health issues:    HPI     ADHD - would like to go back on Adderall.  Previously took Ritalin, but then got a very quick paced job and felt she didn't need it so she stopped taking it.  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 Worsened     Are you having other symptoms that might be associated with depression? Yes:  ADHD    Have you had a significant life event?  OTHER: job change     Are you feeling anxious or having panic attacks?   No    Do you have any concerns with your use of alcohol or other drugs? No    Social History     Tobacco Use     Smoking status: Current Every Day Smoker     Packs/day: 0.10     Years: 1.00     Pack years: 0.10     Types: Other     Start date: 2007     Last attempt to quit: 2017     Years since quittin.9     Smokeless tobacco: Current User     Tobacco comment: social smoker smoked 1-2 cigarettes/month   Substance Use Topics     Alcohol use: No     Frequency: Never     Comment: rare     Drug use: No     PHQ 2017   PHQ-9 Total Score 18 4 11   Q9: Thoughts of better off dead/self-harm past 2 weeks Nearly every day Not at all Several days     LISA-7 SCORE 2016   Total Score - - -   Total Score 3 14 0     In the past two weeks have you had thoughts of suicide or self-harm?  No.    Do you have concerns about your personal safety or the safety of others?   No    Patient Active Problem List   Diagnosis     Moderate major depression (H)     CARDIOVASCULAR SCREENING; LDL GOAL LESS THAN 160     Migraine headache with aura     ADD (attention deficit hyperactivity disorder, inattentive type)     Chronic bilateral low back pain without sciatica     Past Surgical History:   Procedure Laterality Date      COSMETIC SURGERY  late 90s    mole removal: groin area and neck       Social History     Tobacco Use     Smoking status: Current Every Day Smoker     Packs/day: 0.10     Years: 1.00     Pack years: 0.10     Types: Other     Start date: 2007     Last attempt to quit: 2017     Years since quittin.9     Smokeless tobacco: Current User     Tobacco comment: social smoker smoked 1-2 cigarettes/month   Substance Use Topics     Alcohol use: No     Frequency: Never     Comment: rare     Family History   Problem Relation Age of Onset     Unknown/Adopted Mother      Other Cancer Mother         cervical, melanoma, small cell carcinoma     Depression/Anxiety Mother         Bipolar     Depression Mother         Bipolar Disorder     Mental Illness Mother         bi-polar     Breast Cancer Mother      Cancer Father 44        brain     Other Cancer Father         Glioblastoma     Depression/Anxiety Father         PTSD     Chemical Addiction Father         Marijuana     Depression Father      Mental Illness Father         ptsd, depression (untreated)     Asthma Father         childhood     Anxiety Disorder Father         PTSD     Substance Abuse Father         Marijuana     Diabetes Paternal Grandmother         type II caused by obesity     Other Cancer Paternal Grandmother         Osteosarcoma     Mental Illness Paternal Grandmother         Dementia     Osteoporosis Paternal Grandmother      Heart Disease Paternal Grandfather      Coronary Artery Disease Paternal Grandfather      Hypertension Paternal Grandfather      Hyperlipidemia Paternal Grandfather      Prostate Cancer Paternal Grandfather      Chemical Addiction Paternal Grandfather         Alcoholism     Diabetes Paternal Grandfather         type I caused by pancreatitis     Substance Abuse Paternal Grandfather         alcoholic     Thyroid Disease Paternal Grandfather      Other Cancer Paternal Grandfather         Lymphoma     Unknown/Adopted Maternal  "Grandmother      Depression/Anxiety Maternal Grandmother         Bipolar     Other Cancer Maternal Grandmother         Small Cell Carcinoma     Breast Cancer Maternal Grandmother      Mental Illness Maternal Grandmother         Bipolar Disorder     Unknown/Adopted Maternal Grandfather      Diabetes Maternal Grandfather      Breast Cancer Other      Prostate Cancer Other         Great Grandfather     Chemical Addiction Other         Alcoholism     Prostate Cancer Other      Chemical Addiction Other         Alcoholism     Substance Abuse Other         alcoholic     Other Cancer Other         Small Cell Carcinoma     Breast Cancer Other      Mental Illness Other         Bipolar Disorder     Cerebrovascular Disease Other      Substance Abuse Other         alcoholic     Prostate Cancer Other      Chemical Addiction Other         Alcoholism         Review of Systems   ROS COMP: Constitutional, HEENT, cardiovascular, pulmonary, gi and gu systems are negative, except as otherwise noted.      Objective    /68 (BP Location: Right arm, Patient Position: Chair, Cuff Size: Adult Regular)   Pulse 84   Temp 98.3  F (36.8  C) (Oral)   Resp 21   Ht 1.727 m (5' 8\")   Wt 92.4 kg (203 lb 12.8 oz)   SpO2 97%   BMI 30.99 kg/m    Body mass index is 30.99 kg/m .  Physical Exam   GENERAL: healthy, alert and no distress  CARDIO: RRR, no murmurs  RESP: CTAB  PSYCH: mentation appears normal, affect normal/bright        Assessment & Plan     1. 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 LISA (generalized anxiety disorder)  - Slightly worsened  - Increase Zoloft to 100mg daily   - sertraline (ZOLOFT) 100 MG tablet; Take 1 tablet (100 mg) by mouth daily  Dispense: 90 tablet; Refill: 1    3. Chronic bilateral low back pain without sciatica  - Stalble, just needs refills   - naproxen (NAPROSYN) 500 MG tablet; Take 1 tablet (500 mg) by mouth 2 times daily (with meals)  Dispense: 60 tablet; Refill: 5      Return in about 3 months (around 3/16/2020) for Physical Exam. Will recheck depression and ADHD at next visit.     Jazmin Guerra DO  [facility]

## 2019-12-16 NOTE — PATIENT INSTRUCTIONS
[facility]     Discharged by : Nina Carbajal CMA    If you have any questions regarding your visit please contact your care team:     [employer]              Clinic Hours Telephone Number     Dr. Jr Ugarte, CNP   7am-7pm  Monday - Thursday   7am-5pm  Fridays  [phone]   (Appointment scheduling available 24/7)     RN Line  [phone] option 2     Urgent Care - [facility] - 11am-9pm Monday-Friday Saturday-Sunday- 9am-5pm     [facility] -   5pm-9pm Monday-Friday Saturday-Sunday- 9am-5pm    [phone] - [facility]    [phone] - Golf     For a Price Quote for your services, please call our [employer] Price Line at [phone].     What options do I have for visits at the clinic other than the traditional office visit?     To expand how we care for you, many of our providers are utilizing electronic visits (e-visits) and telephone visits, when medically appropriate, for interactions with their patients rather than a visit in the clinic. We also offer nurse visits for many medical concerns. Just like any other service, we will bill your insurance company for this type of visit based on time spent on the phone with your provider. Not all insurance companies cover these visits. Please check with your medical insurance if this type of visit is covered. You will be responsible for any charges that are not paid by your insurance.     E-visits via [employer]: generally incur a $45.00 fee.     Telephone visits:  Time spent on the phone: *charged based on time that is spent on the phone in increments of 10 minutes. Estimated cost:   5-10 mins $30.00   11-20 mins. $59.00   21-30 mins. $85.00       Use [employer] (secure email communication and access to your chart) to send your primary care provider a message or make an appointment. Ask someone on your Team how to sign up for [employer].     As always, Thank you for trusting  us with your health care needs!      [facility]:    Scheduling Appointments  [facility]  Call: [phone]    MelvinLorena quijano, [facility]  Call: [phone]    [facility]  Call: [phone]    For Gastroenterology referrals   [facility]   Clinics and Surgery Center, 4th Floor   [address] [place] [address]   Appointments: [phone]    WHERE TO GO FOR CARE?    Clinic    Make an appointment if you:       Are sick (cold, cough, flu, sore throat, earache or in pain).       Have a small injury (sprain, small cut, burn or broken bone).       Need a physical exam, Pap smear, vaccine or prescription refill.       Have questions about your health or medicines.    To reach us:      Call [phone] ([phone]). Open 24 hours every day. (For counseling services, call [phone].)    Log into [employer] at [employer]be. (Visit [email or web address] to create an account.) Hospital emergency room    An emergency is a serious or life- threatening problem that must be treated right away.    Call 644 or get to the hospital if you have:      Very bad or sudden:            - Chest pain or pressure         - Bleeding         - Head or belly pain         - Dizziness or trouble seeing, walking or                          Speaking      Problems breathing      Blood in your vomit or you are coughing up blood      A major injury (knocked out, loss of a finger or limb, rape, broken bone protruding from skin)    A mental health crisis. (Or call the Mental Health Crisis line at 1-215.763.2676 or Suicide Prevention Hotline at 1-147.116.9931.)    Open 24 hours every day. You don't need an appointment.     Urgent care    Visit urgent care for sickness or small injuries when the clinic is closed. You don't need an appointment. To check hours or find an urgent care near you, visit [email or web address]. Online care    Get  online care from [facility] for more than 70 common problems, like colds, allergies and infections. Open 24 hours every day at:   [email or web address]   Need help deciding?    For advice about where to be seen, you may call your clinic and ask to speak with a nurse. We're here for you 24 hours every day.         If you are deaf or hard of hearing, please let us know. We provide many free services including sign language interpreters, oral interpreters, TTYs, telephone amplifiers, note takers and written materials.

## 2020-01-21 ENCOUNTER — MYC MEDICAL ADVICE (OUTPATIENT)
Dept: FAMILY MEDICINE | Facility: CLINIC | Age: 35
End: 2020-01-21

## 2020-01-21 NOTE — LETTER
[facility]  [address] [place] [id number] [phone]                                                                                                January 29, 2020    Milagro Ferrera  [address] [place] [address]   [facility] [id number]        Dear Ms. Ferrera,    We care about your health and have reviewed your health plan. We have reviewed your medical conditions, medication list, and lab results and are making recommendations based on this review, to better manage your health.    You are in particular need of attention regarding:    - Depression  - Scheduling a Cervical Cancer Screening (pap smear) with your primary care provider. If this was done somewhere else within the last three years, please let us know the date, where it was done, and the result.   - Scheduling an Annual Physical / Wellness Visit with your primary care provider    Please call us at [phone] (or use [employer]) to address the above recommendations.     Thank you for trusting [facility] with your healthcare needs. We appreciate the opportunity to serve you and look forward to supporting you in the future.    Healthy Regards,    Your Care Team

## 2020-01-21 NOTE — TELEPHONE ENCOUNTER
Panel Management Review      Patient has the following on her problem list:     Depression / Dysthymia review    Measure:  Needs PHQ-9 score of 4 or less during index window.  Administer PHQ-9 and if score is 5 or more, send encounter to provider for next steps.        PHQ-9 SCORE 9/20/2017 2/8/2018 1/29/2019   PHQ-9 Total Score - - -   PHQ-9 Total Score 18 4 11       If PHQ-9 recheck is 5 or more, route to provider for next steps.    Patient is due for:  PHQ9      Composite cancer screening  Chart review shows that this patient is due/due soon for the following Pap Smear  Summary:    Patient is due/failing the following:   PAP, PHQ9 and PHYSICAL    Action needed:   Patient needs office visit for Physical, PAP, PHQ9.    Type of outreach:    Sent [employer] message.    Questions for provider review:    None                                                                                                                                    Viridiana Stanley CMA      Chart routed to N/A.

## 2020-01-29 NOTE — TELEPHONE ENCOUNTER
Panel Management Review  Summary:    Type of outreach:    Sent letter.    Encounter routed to No Action Needed.                                                                                                                                 Niki Clark MA

## 2020-03-09 ENCOUNTER — TELEPHONE (OUTPATIENT)
Dept: FAMILY MEDICINE | Facility: CLINIC | Age: 35
End: 2020-03-09

## 2020-03-09 ENCOUNTER — OFFICE VISIT (OUTPATIENT)
Dept: FAMILY MEDICINE | Facility: CLINIC | Age: 35
End: 2020-03-09
Payer: COMMERCIAL

## 2020-03-09 VITALS
HEART RATE: 87 BPM | TEMPERATURE: 98 F | RESPIRATION RATE: 20 BRPM | HEIGHT: 68 IN | BODY MASS INDEX: 31.4 KG/M2 | WEIGHT: 207.2 LBS | SYSTOLIC BLOOD PRESSURE: 112 MMHG | DIASTOLIC BLOOD PRESSURE: 70 MMHG | OXYGEN SATURATION: 98 %

## 2020-03-09 DIAGNOSIS — Z12.4 SCREENING FOR MALIGNANT NEOPLASM OF CERVIX: ICD-10-CM

## 2020-03-09 DIAGNOSIS — Z72.0 TOBACCO ABUSE DISORDER: ICD-10-CM

## 2020-03-09 DIAGNOSIS — F90.0 ATTENTION DEFICIT HYPERACTIVITY DISORDER (ADHD), PREDOMINANTLY INATTENTIVE TYPE: ICD-10-CM

## 2020-03-09 DIAGNOSIS — Z11.3 SCREEN FOR STD (SEXUALLY TRANSMITTED DISEASE): ICD-10-CM

## 2020-03-09 DIAGNOSIS — Z00.00 ROUTINE GENERAL MEDICAL EXAMINATION AT A HEALTH CARE FACILITY: Primary | ICD-10-CM

## 2020-03-09 DIAGNOSIS — F32.1 MODERATE MAJOR DEPRESSION (H): ICD-10-CM

## 2020-03-09 DIAGNOSIS — Z80.3 FAMILY HISTORY OF MALIGNANT NEOPLASM OF BREAST: ICD-10-CM

## 2020-03-09 PROCEDURE — G0145 SCR C/V CYTO,THINLAYER,RESCR: HCPCS | Performed by: FAMILY MEDICINE

## 2020-03-09 PROCEDURE — 87591 N.GONORRHOEAE DNA AMP PROB: CPT | Performed by: FAMILY MEDICINE

## 2020-03-09 PROCEDURE — 87491 CHLMYD TRACH DNA AMP PROBE: CPT | Performed by: FAMILY MEDICINE

## 2020-03-09 PROCEDURE — 99395 PREV VISIT EST AGE 18-39: CPT | Performed by: FAMILY MEDICINE

## 2020-03-09 PROCEDURE — 87624 HPV HI-RISK TYP POOLED RSLT: CPT | Performed by: FAMILY MEDICINE

## 2020-03-09 RX ORDER — METHYLPHENIDATE HYDROCHLORIDE 20 MG/1
20 CAPSULE, EXTENDED RELEASE ORAL DAILY
Qty: 90 CAPSULE | Refills: 0 | Status: SHIPPED | OUTPATIENT
Start: 2020-04-03 | End: 2020-08-14

## 2020-03-09 ASSESSMENT — ENCOUNTER SYMPTOMS
ARTHRALGIAS: 1
HEADACHES: 1
PALPITATIONS: 0
COUGH: 0
DIZZINESS: 0
PARESTHESIAS: 0
CHILLS: 0
HEMATOCHEZIA: 0
CONSTIPATION: 1
BREAST MASS: 0
DIARRHEA: 1
FEVER: 0
WEAKNESS: 0
JOINT SWELLING: 1
FREQUENCY: 0
EYE PAIN: 0
NAUSEA: 0
HEARTBURN: 1
SORE THROAT: 0
HEMATURIA: 0
SHORTNESS OF BREATH: 0
MYALGIAS: 0
ABDOMINAL PAIN: 0
NERVOUS/ANXIOUS: 1

## 2020-03-09 ASSESSMENT — ANXIETY QUESTIONNAIRES
6. BECOMING EASILY ANNOYED OR IRRITABLE: NOT AT ALL
7. FEELING AFRAID AS IF SOMETHING AWFUL MIGHT HAPPEN: NOT AT ALL
3. WORRYING TOO MUCH ABOUT DIFFERENT THINGS: SEVERAL DAYS
IF YOU CHECKED OFF ANY PROBLEMS ON THIS QUESTIONNAIRE, HOW DIFFICULT HAVE THESE PROBLEMS MADE IT FOR YOU TO DO YOUR WORK, TAKE CARE OF THINGS AT HOME, OR GET ALONG WITH OTHER PEOPLE: NOT DIFFICULT AT ALL
2. NOT BEING ABLE TO STOP OR CONTROL WORRYING: NOT AT ALL
GAD7 TOTAL SCORE: 3
1. FEELING NERVOUS, ANXIOUS, OR ON EDGE: SEVERAL DAYS
5. BEING SO RESTLESS THAT IT IS HARD TO SIT STILL: NOT AT ALL

## 2020-03-09 ASSESSMENT — MIFFLIN-ST. JEOR: SCORE: 1698.35

## 2020-03-09 ASSESSMENT — PATIENT HEALTH QUESTIONNAIRE - PHQ9
5. POOR APPETITE OR OVEREATING: SEVERAL DAYS
SUM OF ALL RESPONSES TO PHQ QUESTIONS 1-9: 6

## 2020-03-09 NOTE — PATIENT INSTRUCTIONS
Preventive Health Recommendations  Female Ages 26 - 39  Yearly exam:   See your health care provider every year in order to    Review health changes.     Discuss preventive care.      Review your medicines if you your doctor has prescribed any.    Until age 30: Get a Pap test every three years (more often if you have had an abnormal result).    After age 30: Talk to your doctor about whether you should have a Pap test every 3 years or have a Pap test with HPV screening every 5 years.   You do not need a Pap test if your uterus was removed (hysterectomy) and you have not had cancer.  You should be tested each year for STDs (sexually transmitted diseases), if you're at risk.   Talk to your provider about how often to have your cholesterol checked.  If you are at risk for diabetes, you should have a diabetes test (fasting glucose).  Shots: Get a flu shot each year. Get a tetanus shot every 10 years.   Nutrition:     Eat at least 5 servings of fruits and vegetables each day.    Eat whole-grain bread, whole-wheat pasta and brown rice instead of white grains and rice.    Get adequate Calcium and Vitamin D.     Lifestyle    Exercise at least 150 minutes a week (30 minutes a day, 5 days of the week). This will help you control your weight and prevent disease.    Limit alcohol to one drink per day.    No smoking.     Wear sunscreen to prevent skin cancer.    See your dentist every six months for an exam and cleaning.    [facility]     Discharged by : Nina Carbajal CMA    If you have any questions regarding your visit please contact your care team:     [employer]              Clinic Hours Telephone Number     Dr. Jr Ugarte, JI   7am-7pm  Monday - Thursday   7am-5pm  Fridays  [phone]   (Appointment scheduling available 24/7)     RN Line  [phone] option 2     Urgent Care - [facility] and [facility] Torres - 11am-9pm  Monday-Friday Saturday-Sunday- 9am-5pm     [place] -   5pm-9pm Monday-Friday Saturday-Sunday- 9am-5pm    [phone] - [facility]ene Torres    [phone] - [place]     For a Price Quote for your services, please call our Consumer Price Line at [phone].     What options do I have for visits at the clinic other than the traditional office visit?     To expand how we care for you, many of our providers are utilizing electronic visits (e-visits) and telephone visits, when medically appropriate, for interactions with their patients rather than a visit in the clinic. We also offer nurse visits for many medical concerns. Just like any other service, we will bill your insurance company for this type of visit based on time spent on the phone with your provider. Not all insurance companies cover these visits. Please check with your medical insurance if this type of visit is covered. You will be responsible for any charges that are not paid by your insurance.     E-visits via [employer]: generally incur a $45.00 fee.     Telephone visits:  Time spent on the phone: *charged based on time that is spent on the phone in increments of 10 minutes. Estimated cost:   5-10 mins $30.00   11-20 mins. $59.00   21-30 mins. $85.00       Use [employer] (secure email communication and access to your chart) to send your primary care provider a message or make an appointment. Ask someone on your Team how to sign up for [employer].     As always, Thank you for trusting us with your health care needs!      [facility]:    Scheduling Appointments  [facility] [place]  Call: [phone]    Lorena Aly [facility]  Call: [phone]    [facility]  Call: [phone]    For Gastroenterology referrals   [facility]   Clinics and Surgery [place], 4th Floor   [address] [place] [id number]   Appointments: [phone]    WHERE TO GO FOR  CARE?    Clinic    Make an appointment if you:       Are sick (cold, cough, flu, sore throat, earache or in pain).       Have a small injury (sprain, small cut, burn or broken bone).       Need a physical exam, Pap smear, vaccine or prescription refill.       Have questions about your health or medicines.    To reach us:      Call [phone] ([phone]). Open 24 hours every day. (For counseling services, call [phone].)    Log into [employer] at [employer]. (Visit [email or web address] to create an account.) Hospital emergency room    An emergency is a serious or life- threatening problem that must be treated right away.    Call 185 or get to the hospital if you have:      Very bad or sudden:            - Chest pain or pressure         - Bleeding         - Head or belly pain         - Dizziness or trouble seeing, walking or                          Speaking      Problems breathing      Blood in your vomit or you are coughing up blood      A major injury (knocked out, loss of a finger or limb, rape, broken bone protruding from skin)    A mental health crisis. (Or call the Mental Health Crisis line at 1-520.983.5529 or Suicide Prevention Hotline at 1-431.715.2274.)    Open 24 hours every day. You don't need an appointment.     Urgent care    Visit urgent care for sickness or small injuries when the clinic is closed. You don't need an appointment. To check hours or find an urgent care near you, visit [email or web address]. Online care    Get online care from [facility] for more than 70 common problems, like colds, allergies and infections. Open 24 hours every day at:   [email or web address]   Need help deciding?    For advice about where to be seen, you may call your clinic and ask to speak with a nurse. We're here for you 24 hours every day.         If you are deaf or hard of hearing, please let us know. We provide many free services including sign language interpreters, oral interpreters, TTYs, telephone  amplifiers, note takers and written materials.
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
## 2020-03-09 NOTE — TELEPHONE ENCOUNTER
Oncology/Surgical Oncology Referral Request:     Specialty Requested: Medical Oncology - Genetic Counseling    Referring Provider: Jazmin Guerra DO    Referring Clinic/Organization: [facility]    Records location: [facility]     Requested Provider (if specified): Not Specified      Pt will call back when ready to schedule.

## 2020-03-10 LAB
C TRACH DNA SPEC QL NAA+PROBE: NEGATIVE
N GONORRHOEA DNA SPEC QL NAA+PROBE: NEGATIVE
SPECIMEN SOURCE: NORMAL
SPECIMEN SOURCE: NORMAL

## 2020-03-10 ASSESSMENT — ANXIETY QUESTIONNAIRES: GAD7 TOTAL SCORE: 3

## 2020-03-11 LAB
COPATH REPORT: NORMAL
PAP: NORMAL

## 2020-03-12 ENCOUNTER — VIRTUAL VISIT (OUTPATIENT)
Dept: FAMILY MEDICINE | Facility: OTHER | Age: 35
End: 2020-03-12

## 2020-03-13 LAB
FINAL DIAGNOSIS: NORMAL
HPV HR 12 DNA CVX QL NAA+PROBE: NEGATIVE
HPV16 DNA SPEC QL NAA+PROBE: NEGATIVE
HPV18 DNA SPEC QL NAA+PROBE: NEGATIVE
SPECIMEN DESCRIPTION: NORMAL
SPECIMEN SOURCE CVX/VAG CYTO: NORMAL

## 2020-03-13 NOTE — PROGRESS NOTES
"Date: 2020 13:58:07  Clinician: Ceasar Espana  Clinician NPI: [id number]  Patient: Milagro Ferrera  Patient : 1985  Patient Address: [address] [place] [address] [address], [place] [address]  Patient Phone: [phone]  Visit Protocol: URI  Patient Summary:  Milagro is a 34 year old ( : 1985 ) female who initiated a Visit for COVID-19 (Coronavirus) evaluation and screening. When asked the question \"Please sign me up to receive news, health information and promotions from [employer].\", Milagro responded \"No\".    Milagro states her symptoms started today.   Her symptoms consist of rhinitis, a cough, nasal congestion, and a headache.   Symptom details     Nasal secretions: The color of her mucus is clear.    Cough: Milagro coughs a few times an hour and her cough is not more bothersome at night. Phlegm comes into her throat when she coughs. She does not believe her cough is caused by post-nasal drip. The color of the phlegm is clear.     Headache: She states the headache is mild (1-3 on a 10 point pain scale).      Milagro denies having wheezing, ear pain, malaise, sore throat, fever, teeth pain, enlarged lymph nodes, chills, facial pain or pressure, and myalgias. She also denies taking antibiotic medication for the symptoms and having recent facial or sinus surgery in the past 60 days. She is not experiencing dyspnea.   Precipitating events  She has recently been exposed to someone with influenza. Milagro has been in close contact with the following high risk individuals: people with asthma, heart disease or diabetes and adults 65 or older.   Pertinent COVID-19 (Coronavirus) information  Milagro has not traveled internationally in the last 14 days before the start of her symptoms.   Milagro has not had close contact with a laboratory confirmed positive COVID-19 patient within 14 days of symptom onset.   Pertinent medical history  Milagro does not get yeast infections when she takes antibiotics.   " Milagro does not need a return to work/school note.   Weight: 206 lbs   Milagro smokes or uses smokeless tobacco.   She denies pregnancy and denies breastfeeding. She has menstruated in the past month.   Weight: 206 lbs    MEDICATIONS: Pain Reliever (acetaminophen-aspirin) oral, naproxen oral, methylphenidate HCl oral, sertraline oral, ALLERGIES: NKDA  Clinician Response:  Dear Milagro,  Based on the information provided, you have a viral upper respiratory infection, otherwise known as a cold. Symptoms vary from person to person, but can include sneezing, coughing, a runny nose, sore throat, and headache and range from mild to severe.  Unfortunately, there are no medications that can cure a cold, so treatment is focused on controlling symptoms as much as possible. Most people gradually feel better until symptoms are gone in 1-2 weeks.  Medication information  Because you have a viral infection, antibiotics will not help you get better. Treating a viral infection with antibiotics could actually make you feel worse.  Unless you are allergic to the over-the-counter medication(s) below, I recommend using:     Acetaminophen (Tylenol or store brand) oral tablet. Take 1-2 tablets by mouth every 4-6 hours to help with the discomfort.   Over-the-counter medications do not require a prescription. Ask the pharmacist if you have any questions.  Self care  The following tips will keep you as comfortable as possible while you recover:     Rest    Drink plenty of water and other liquids    Take a hot shower to loosen congestion    Take a spoonful of honey to reduce your cough     Also, as your provider, I need you to know that becoming tobacco-free is the most important thing you can do to protect your current and future health.  When to seek care  Please be seen in a clinic or urgent care if new symptoms develop, or symptoms become worse.  COVID-19 (Coronavirus) General Information  We understand it may be concerning to be ill  with symptoms that overlap with COVID-19 (Coronavirus) symptoms. Below are some helpful information on COVID-19 (Coronavirus).  How can I protect myself and others from the COVID-19 (Coronavirus)?  Because there is currently no vaccine to prevent infection, the best way to protect yourself is to avoid being exposed to this virus. The CDC recommends the following additional steps:     Wash your hands often with soap and water for at least 20 seconds, especially after blowing your nose, coughing, or sneezing; going to the bathroom; and before eating or preparing food.  Use an alcohol-based hand  that contains at least 60 percent alcohol if soap and water are not available.        Avoid touching your eyes, nose and mouth with unwashed hands.    Avoid close contact with people who are sick.    Stay home when you are sick.    Cover your cough or sneeze with a tissue, then throw the tissue in the trash.    Clean and disinfect frequently touched objects and surfaces.     You can help stop COVID-19 (Coronavirus) by knowing the signs and symptoms:     Fever    Cough    Shortness of breath     Contact your healthcare provider if   Develop symptoms   AND   Have been in close contact with a person known to have COVID-19 (Coronavirus) or live in or have recently traveled from an area with ongoing spread of COVID-19 (Coronavirus). Call ahead before you go to a doctor's office or emergency room. Tell them about your recent travel and your symptoms.   For the most up to date information, visit the CDC's website.  Steps to help prevent the spread of COVID-19 (Coronavirus) if you are sick  If you are sick with COVID-19 (Coronavirus) or suspect you are infected with the virus that causes COVID-19 (Coronavirus), follow the steps below to help prevent the disease from spreading&nbsp;to people in your home and community.     Stay home except to get medical care. Home isolation may be started in consultation with your healthcare  "clinician.    Separate yourself from other people and animals in your home.    Call ahead before visiting your doctor if you have a medical appointment.    Wear a facemask when you are around other people.    Cover your cough and sneezes.    Clean your hands often.    Avoid sharing personal household items.    Clean and disinfect frequently touched objects and surfaces everyday.    You will need to have someone drop off medications or household supplies (if needed) at your house without coming inside or in contact with you or others living in your house.    Monitor your symptoms and seek prompt medical care if your illness is worsening (e.g. Difficulty breathing).    Discontinue home isolation only in consultation with your healthcare provider.     For more detailed and up to date information on what to do if you are sick, visit this link: What to Do If You Are Sick With Coronavirus Disease 2019 (COVID-19).  Do I need to be tested for COVID-19 (Coronavirus)?     At this time, the limited number of tests available are controlled by the state and local health departments and are being reserved for more seriously ill patients, those with known exposure to confirmed patients, and those with recent travel (within 14 days) to countries with high rates of COVID-19 (Coronavirus).    Decisions on which patients receive testing will be based on the local spread of COVID-19 (Coronavirus) as well as the symptoms. Your healthcare provider will make the final decision on whether you should be tested.    In the meantime, if you have concerns that you may have been exposed, it is reasonable to practice \"social distancing.\"&nbsp; If you are ill with a cold or flu like illness, please monitor your symptoms and reach out to your healthcare provider if your symptoms worsen.    For more up to date information, visit this link: COVID-19 (Coronavirus) Frequently Asked Questions and Answers.      Diagnosis: Viral URI  Diagnosis ICD: " J06.9  Additional Clinician Notes: Milagro I think this is likely a simple cold.  You have no fever to suggest influenza (despite your exposure) and no other risk factors for COVID-19 to merit testing.  I know all of this is scary, but I think you'll be just fine.  Please follow the instructions for when to follow up if you're not feeling better.

## 2020-03-25 ENCOUNTER — E-VISIT (OUTPATIENT)
Dept: FAMILY MEDICINE | Facility: CLINIC | Age: 35
End: 2020-03-25
Payer: COMMERCIAL

## 2020-03-25 DIAGNOSIS — R10.13 EPIGASTRIC PAIN: Primary | ICD-10-CM

## 2020-03-25 PROCEDURE — 99422 OL DIG E/M SVC 11-20 MIN: CPT | Performed by: FAMILY MEDICINE

## 2020-04-08 ENCOUNTER — MYC REFILL (OUTPATIENT)
Dept: FAMILY MEDICINE | Facility: CLINIC | Age: 35
End: 2020-04-08

## 2020-04-08 DIAGNOSIS — F90.0 ATTENTION DEFICIT HYPERACTIVITY DISORDER (ADHD), PREDOMINANTLY INATTENTIVE TYPE: ICD-10-CM

## 2020-04-10 RX ORDER — METHYLPHENIDATE HYDROCHLORIDE 20 MG/1
20 CAPSULE, EXTENDED RELEASE ORAL DAILY
Qty: 90 CAPSULE | Refills: 0 | Status: CANCELLED | OUTPATIENT
Start: 2020-04-10

## 2020-04-10 NOTE — TELEPHONE ENCOUNTER
Requested Prescriptions   Pending Prescriptions Disp Refills     methylphenidate (RITALIN LA) 20 MG 24 hr capsule        Last Written Prescription Date:  4/3/2020  Last Fill Quantity: 90 caps,   # refills: 0  Last Office Visit: 3/9/2020  Future Office visit:       Routing refill request to provider for review/approval because:  Drug not on the [facility], [facility] or [facility] refill protocol or controlled substance 90 capsule 0     Sig: Take 20 mg by mouth daily       There is no refill protocol information for this order

## 2020-04-10 NOTE — TELEPHONE ENCOUNTER
Per chart review, Rx sent to [facility] with receipt confirmed on 3/9/20 for a fill date of 4/3/20.  Patient notified via MyChart and closing encounter.    Fiona Myers RN

## 2020-08-05 DIAGNOSIS — F41.1 GAD (GENERALIZED ANXIETY DISORDER): ICD-10-CM

## 2020-08-06 RX ORDER — SERTRALINE HYDROCHLORIDE 100 MG/1
TABLET, FILM COATED ORAL
Qty: 90 TABLET | Refills: 3 | Status: SHIPPED | OUTPATIENT
Start: 2020-08-06 | End: 2021-06-01

## 2020-08-14 ENCOUNTER — MYC REFILL (OUTPATIENT)
Dept: FAMILY MEDICINE | Facility: CLINIC | Age: 35
End: 2020-08-14

## 2020-08-14 DIAGNOSIS — F90.0 ATTENTION DEFICIT HYPERACTIVITY DISORDER (ADHD), PREDOMINANTLY INATTENTIVE TYPE: ICD-10-CM

## 2020-08-14 NOTE — LETTER
[facility]  [address] [place] [id number] [phone]                                                                                                August 19, 2020    Milagro Ferrera  [address] [place] [address]   [facility] [id number]        Dear Ms. Ferrera,    We recently received a call from your pharmacy requesting a refill of your medication.     A review of your chart indicates that an appointment is required with your provider. Please call the clinic at [phone] to schedule your appointment for ADHD follow-up.    We have authorized one refill of your medication to allow time for you to schedule. If you have a history of diabetes or high cholesterol, please come fasting to the appointment. Fasting entails nothing to eat or drink 8 hours prior to your appointment; with the exception of water. You may take your medication the day of the appointment.    Sincerely,      Jazmin Guerra DO/mckinley

## 2020-08-18 RX ORDER — METHYLPHENIDATE HYDROCHLORIDE 20 MG/1
20 CAPSULE, EXTENDED RELEASE ORAL DAILY
Qty: 90 CAPSULE | Refills: 0 | Status: SHIPPED | OUTPATIENT
Start: 2020-08-18 | End: 2020-09-02

## 2020-08-18 NOTE — TELEPHONE ENCOUNTER
Requested Prescriptions   Pending Prescriptions Disp Refills     methylphenidate (RITALIN LA) 20 MG 24 hr capsule 90 capsule 0     Sig: Take 20 mg by mouth daily       There is no refill protocol information for this order        Last Written Prescription Date:  4/3/2020  Last Fill Quantity: 90,  # refills: 0   Last office visit: 3/9/2020 with prescribing provider:  Charlie and E-Visit 3/25/2020    Future Office Visit:          Routing refill request to provider for review/approval because:  Drug not on the [facility] refill protocol       Ofelia Gonzalez RN  Triage Nurse  [facility]  Appointment line: [phone]  [facility], 24 hour nurse line, available by calling clinic at [phone] and following prompts.

## 2020-08-18 NOTE — TELEPHONE ENCOUNTER
Last visit notes from 3-9-20.:   Return in about 6 months (around 9/9/2020) for ADHD/depression follow up      Jazmin Guerra, [facility]    Message sent that appt is needed before next refill Opal Espana RN

## 2020-09-02 ENCOUNTER — VIRTUAL VISIT (OUTPATIENT)
Dept: FAMILY MEDICINE | Facility: CLINIC | Age: 35
End: 2020-09-02
Payer: COMMERCIAL

## 2020-09-02 DIAGNOSIS — F90.0 ATTENTION DEFICIT HYPERACTIVITY DISORDER (ADHD), PREDOMINANTLY INATTENTIVE TYPE: ICD-10-CM

## 2020-09-02 PROCEDURE — 99213 OFFICE O/P EST LOW 20 MIN: CPT | Mod: 95 | Performed by: FAMILY MEDICINE

## 2020-09-02 RX ORDER — METHYLPHENIDATE HYDROCHLORIDE 20 MG/1
20 CAPSULE, EXTENDED RELEASE ORAL DAILY
Qty: 90 CAPSULE | Refills: 0 | Status: SHIPPED | OUTPATIENT
Start: 2020-09-02 | End: 2020-12-30

## 2020-09-02 ASSESSMENT — PATIENT HEALTH QUESTIONNAIRE - PHQ9: SUM OF ALL RESPONSES TO PHQ QUESTIONS 1-9: 6

## 2020-09-02 NOTE — PATIENT INSTRUCTIONS
Orders Placed This Encounter     methylphenidate (RITALIN LA) 20 MG 24 hr capsule     Sig: Take 20 mg by mouth daily     Dispense:  90 capsule     Refill:  0     Cancel refill sent 8-

## 2020-09-02 NOTE — PROGRESS NOTES
"Milagro Ferrera is a 34 year old female who is being evaluated via a billable video visit.      The patient has been notified of following:     \"This video visit will be conducted via a call between you and your physician/provider. We have found that certain health care needs can be provided without the need for an in-person physical exam.  This service lets us provide the care you need with a video conversation.  If a prescription is necessary we can send it directly to your pharmacy.  If lab work is needed we can place an order for that and you can then stop by our lab to have the test done at a later time.    Video visits are billed at different rates depending on your insurance coverage.  Please reach out to your insurance provider with any questions.    If during the course of the call the physician/provider feels a video visit is not appropriate, you will not be charged for this service.\"    Patient has given verbal consent for Video visit? Yes  How would you like to obtain your AVS? MyChart  If you are dropped from the video visit, the video invite should be resent to: Text to cell phone: [phone]  Will anyone else be joining your video visit? No        Subjective     Milagro Ferrera is a 34 year old female who presents today via video visit for the following health issues:    HPI    Medication Followup of Ritalin    Taking Medication as prescribed: yes    Side Effects:  None    Medication Helping Symptoms:  yes          Video Start Time: 2:48 PM    She did not  recent RX. Reviewed that we will send in new RX but note to cancel previus.       Also reviewed need to make appt to establish with a new PCP due to my long-term    Review of Systems   Constitutional, HEENT, cardiovascular, pulmonary, gi and gu systems are negative, except as otherwise noted.      Objective           Vitals:  No vitals were obtained today due to virtual visit.    Physical Exam     GENERAL: Healthy, alert and no " "distress  EYES: Eyes grossly normal to inspection.  No discharge or erythema, or obvious scleral/conjunctival abnormalities.  RESP: No audible wheeze, cough, or visible cyanosis.  No visible retractions or increased work of breathing.    SKIN: Visible skin clear. No significant rash, abnormal pigmentation or lesions.  NEURO: Cranial nerves grossly intact.  Mentation and speech appropriate for age.  PSYCH: Mentation appears normal, affect normal/bright, judgement and insight intact, normal speech and appearance well-groomed.              Assessment & Plan     ADD (attention deficit hyperactivity disorder, inattentive type)  stable  - methylphenidate (RITALIN LA) 20 MG 24 hr capsule; Take 20 mg by mouth daily     BMI:   Estimated body mass index is 31.5 kg/m  as calculated from the following:    Height as of 3/9/20: 1.727 m (5' 8\").    Weight as of 3/9/20: 94 kg (207 lb 3.2 oz).           Patient Instructions     Orders Placed This Encounter     methylphenidate (RITALIN LA) 20 MG 24 hr capsule     Sig: Take 20 mg by mouth daily     Dispense:  90 capsule     Refill:  0     Cancel refill sent 8-           Return in about 3 months (around 12/2/2020) for Review response to medications.    Jr John MD, MD  [facility]      Video-Visit Details    Type of service:  Video Visit    Video End Time:3:10 pm    Originating Location (pt. Location): Home    Distant Location (provider location):  [facility]     Platform used for Video Visit: Tara"

## 2020-09-08 ENCOUNTER — TELEPHONE (OUTPATIENT)
Dept: FAMILY MEDICINE | Facility: CLINIC | Age: 35
End: 2020-09-08

## 2020-09-08 DIAGNOSIS — F41.1 GAD (GENERALIZED ANXIETY DISORDER): ICD-10-CM

## 2020-09-09 RX ORDER — SERTRALINE HYDROCHLORIDE 100 MG/1
TABLET, FILM COATED ORAL
Qty: 90 TABLET | Refills: 3 | OUTPATIENT
Start: 2020-09-09

## 2020-09-09 NOTE — TELEPHONE ENCOUNTER
Called and clarified with patient, she stated the 50 mg one was an accident and she asked them to cancel it. She verified she has enough medication. Refusal sent to pharmacy.     Padmini Crowe RN

## 2020-09-09 NOTE — LETTER
September 17, 2019      Milagro Ferrera  [address] [place] [address] [address]   [facility] [address]              Dear Milagro,    We have tried to contact you about your health, but have been unable to reach you.  Please call us as soon as possible so we can provide you with the best care possible.  We will continue to check in with you throughout the year to complete these items of care, if you are not able to complete these items at this time.  If you would like to complete the missing items for your care, please contact us at [phone].    We recommend the following:  - Schedule a PHYSICAL EXAM / WELLNESS APPOINTMENT. If you go elsewhere for these visits, please disregard this message  - Schedule a Cervical Cancer Screening (PAP SMEAR EXAM) which is due.  Please disregard this reminder if you have had this exam elsewhere within the last year.  It would be helpful for us to have a copy of your recent pap smear report in our file so that we can best coordinate your care.      Sincerely,     Your Care Team                   
no
36.8

## 2020-09-09 NOTE — TELEPHONE ENCOUNTER
Patient without complaints.  Denies contractions.  Had ultrasound today which revealed a 8 lbs. 9 oz. fetus in the 92nd percentile in the vertex presentation.  BPP 8 out of 8 MVP 11 with an IRENA of 29.  Will await suggestions from [facility] but I'm leaning towards delivery next week.  We'll schedule for prenatal testing on Friday with an NST.  Will await to hear from [facility] this evening and devise a plan for delivery.   Please clarify with patient refill request. We refilled 100 mg tablets 8-6-2020. Not sure why the request for 50 mg tablets

## 2020-12-30 ENCOUNTER — VIRTUAL VISIT (OUTPATIENT)
Dept: FAMILY MEDICINE | Facility: CLINIC | Age: 35
End: 2020-12-30
Payer: COMMERCIAL

## 2020-12-30 DIAGNOSIS — F90.0 ATTENTION DEFICIT HYPERACTIVITY DISORDER (ADHD), PREDOMINANTLY INATTENTIVE TYPE: Primary | ICD-10-CM

## 2020-12-30 DIAGNOSIS — M25.511 RIGHT SHOULDER PAIN, UNSPECIFIED CHRONICITY: ICD-10-CM

## 2020-12-30 DIAGNOSIS — Z71.89 COUNSELED ABOUT COVID-19 VIRUS INFECTION: ICD-10-CM

## 2020-12-30 PROCEDURE — 99214 OFFICE O/P EST MOD 30 MIN: CPT | Mod: 95 | Performed by: FAMILY MEDICINE

## 2020-12-30 RX ORDER — DEXTROAMPHETAMINE SULFATE, DEXTROAMPHETAMINE SACCHARATE, AMPHETAMINE SULFATE AND AMPHETAMINE ASPARTATE 5; 5; 5; 5 MG/1; MG/1; MG/1; MG/1
20 CAPSULE, EXTENDED RELEASE ORAL DAILY
Qty: 90 CAPSULE | Refills: 0 | Status: SHIPPED | OUTPATIENT
Start: 2020-12-30 | End: 2021-06-01

## 2020-12-30 ASSESSMENT — ANXIETY QUESTIONNAIRES
5. BEING SO RESTLESS THAT IT IS HARD TO SIT STILL: NOT AT ALL
7. FEELING AFRAID AS IF SOMETHING AWFUL MIGHT HAPPEN: NOT AT ALL
IF YOU CHECKED OFF ANY PROBLEMS ON THIS QUESTIONNAIRE, HOW DIFFICULT HAVE THESE PROBLEMS MADE IT FOR YOU TO DO YOUR WORK, TAKE CARE OF THINGS AT HOME, OR GET ALONG WITH OTHER PEOPLE: NOT DIFFICULT AT ALL
6. BECOMING EASILY ANNOYED OR IRRITABLE: NOT AT ALL
1. FEELING NERVOUS, ANXIOUS, OR ON EDGE: NOT AT ALL
3. WORRYING TOO MUCH ABOUT DIFFERENT THINGS: NOT AT ALL
GAD7 TOTAL SCORE: 0
2. NOT BEING ABLE TO STOP OR CONTROL WORRYING: NOT AT ALL

## 2020-12-30 ASSESSMENT — PATIENT HEALTH QUESTIONNAIRE - PHQ9
5. POOR APPETITE OR OVEREATING: NOT AT ALL
SUM OF ALL RESPONSES TO PHQ QUESTIONS 1-9: 2

## 2020-12-30 NOTE — PROGRESS NOTES
"Milagro Ferrera is a 35 year old female who is being evaluated via a billable video visit.      The patient has been notified of following:     \"This video visit will be conducted via a call between you and your physician/provider. We have found that certain health care needs can be provided without the need for an in-person physical exam.  This service lets us provide the care you need with a video conversation.  If a prescription is necessary we can send it directly to your pharmacy.  If lab work is needed we can place an order for that and you can then stop by our lab to have the test done at a later time.    Video visits are billed at different rates depending on your insurance coverage.  Please reach out to your insurance provider with any questions.    If during the course of the call the physician/provider feels a video visit is not appropriate, you will not be charged for this service.\"    Patient has given verbal consent for Video visit? Yes  How would you like to obtain your AVS? MyChart  If you are dropped from the video visit, the video invite should be resent to: MyChart  Will anyone else be joining your video visit? No       ====================================================================    Subjective     Milagro Ferrera is a 35 year old female who presents today via video visit for the following health issues:    HPI     Depression, Anxiety, ADHD Follow-Up    How are you doing with your depression since your last visit? No change    How are you doing with your anxiety since your last visit?  No change    Are you having other symptoms that might be associated with depression or anxiety? No    Have you had a significant life event? OTHER: Had to help a friend through severe depression recently     Do you have any concerns with your use of alcohol or other drugs? No    Patient currently takes Ritalin LA 20mg daily and sertraline 100 mg daily.  She is interested in switching to Adderall because it is " much cheaper.      Patient also c/o right shoulder pain around her clavicle intermittently for the past 10 years.  No known injury.  She was seen for this previously, but hasn't had XRs before.  Has been bothering her more lately.  Denies weakness and has had no loss in ROM.    She has read some studies that report vitamin D being good for COVID.  Would like to confirm that this is true.      Social History     Tobacco Use     Smoking status: Current Every Day Smoker     Packs/day: 0.10     Years: 1.00     Pack years: 0.10     Types: Other     Start date: 5/1/2007     Last attempt to quit: 1/20/2017     Years since quitting: 3.9     Smokeless tobacco: Current User     Tobacco comment: social smoker smoked 1-2 cigarettes/month   Substance Use Topics     Alcohol use: Yes     Frequency: Never     Comment: rare     Drug use: No     PHQ 3/9/2020 9/2/2020 12/30/2020   PHQ-9 Total Score 6 6 2   Q9: Thoughts of better off dead/self-harm past 2 weeks Not at all Not at all Not at all     LISA-7 SCORE 2/8/2018 3/9/2020 12/30/2020   Total Score - - -   Total Score 0 3 0     Last PHQ-9 12/30/2020   1.  Little interest or pleasure in doing things 1   2.  Feeling down, depressed, or hopeless 0   3.  Trouble falling or staying asleep, or sleeping too much 1   4.  Feeling tired or having little energy 0   5.  Poor appetite or overeating 0   6.  Feeling bad about yourself 0   7.  Trouble concentrating 0   8.  Moving slowly or restless 0   Q9: Thoughts of better off dead/self-harm past 2 weeks 0   PHQ-9 Total Score 2   Difficulty at work, home, or with people Not difficult at all     LISA-7  12/30/2020   1. Feeling nervous, anxious, or on edge 0   2. Not being able to stop or control worrying 0   3. Worrying too much about different things 0   4. Trouble relaxing 0   5. Being so restless that it is hard to sit still 0   6. Becoming easily annoyed or irritable 0   7. Feeling afraid, as if something awful might happen 0   LISA-7 Total  Score 0   If you checked any problems, how difficult have they made it for you to do your work, take care of things at home, or get along with other people? Not difficult at all       Suicide Assessment Five-step Evaluation and Treatment (SAFE-T)      How many servings of fruits and vegetables do you eat daily?  1-2    On average, how many sweetened beverages do you drink each day (Examples: soda, juice, sweet tea, etc.  Do NOT count diet or artificially sweetened beverages)?   0    How many days per week do you exercise enough to make your heart beat faster? 0    How many minutes a day do you exercise enough to make your heart beat faster? 0    How many days per week do you miss taking your medication? 0    Review of Systems   Constitutional, HEENT, cardiovascular, pulmonary, gi and gu systems are negative, except as otherwise noted.      Objective           Vitals:  No vitals were obtained today due to virtual visit.    Physical Exam     GENERAL: Healthy, alert and no distress  EYES: Eyes grossly normal to inspection.  No discharge or erythema, or obvious scleral/conjunctival abnormalities.  RESP: No audible wheeze, cough, or visible cyanosis.  No visible retractions or increased work of breathing.    SKIN: Visible skin clear. No significant rash, abnormal pigmentation or lesions.  NEURO: Cranial nerves grossly intact.  Mentation and speech appropriate for age.  PSYCH: Mentation appears normal, affect normal/bright, judgement and insight intact, normal speech and appearance well-groomed.          Assessment & Plan     ADD (attention deficit hyperactivity disorder, inattentive type)  - Currently taking Ritalin LA 20mg daily, but would like to transition to Adderall since it's much cheaper  - 90 day supply of Adderall sent for her and she will follow up in 3 months for recheck   - ADDERALL XR 20 MG 24 hr capsule; Take 1 capsule (20 mg) by mouth daily    Right shoulder pain, unspecified chronicity  - Most likely a  rotator cuff tendinitis or impingement syndrome  - Advised RICE therapy and NSAIDs for now  - Come in to see me in office at her leisure or the orthopedic walk in clinic for x-rays if worsening    Counseled about COVID-19 virus infection  - Discussed use of vitamin d supplementation has been shown to decrease severity of symptoms in patients who contract COVID-19.  She will supplement with OTC vitamin d        Return in about 3 months (around 3/30/2021).    Jazmin Guerra DO  [facility]      Video-Visit Details    Type of service:  Video Visit    Video Start Time: 8:39 AM  Video End Time: 8:54 AM    Originating Location (pt. Location): Home    Distant Location (provider location):  [facility]     Platform used for Video Visit: Tara

## 2020-12-31 ASSESSMENT — ANXIETY QUESTIONNAIRES: GAD7 TOTAL SCORE: 0

## 2021-01-15 ENCOUNTER — HEALTH MAINTENANCE LETTER (OUTPATIENT)
Age: 36
End: 2021-01-15

## 2021-03-02 ENCOUNTER — OFFICE VISIT (OUTPATIENT)
Dept: FAMILY MEDICINE | Facility: CLINIC | Age: 36
End: 2021-03-02
Payer: COMMERCIAL

## 2021-03-02 VITALS
SYSTOLIC BLOOD PRESSURE: 110 MMHG | HEART RATE: 114 BPM | TEMPERATURE: 98.5 F | HEIGHT: 67 IN | OXYGEN SATURATION: 95 % | DIASTOLIC BLOOD PRESSURE: 72 MMHG | WEIGHT: 208.2 LBS | BODY MASS INDEX: 32.68 KG/M2

## 2021-03-02 DIAGNOSIS — F90.0 ATTENTION DEFICIT HYPERACTIVITY DISORDER (ADHD), PREDOMINANTLY INATTENTIVE TYPE: ICD-10-CM

## 2021-03-02 DIAGNOSIS — R10.13 EPIGASTRIC PAIN: Primary | ICD-10-CM

## 2021-03-02 PROCEDURE — 99214 OFFICE O/P EST MOD 30 MIN: CPT | Performed by: FAMILY MEDICINE

## 2021-03-02 RX ORDER — OMEPRAZOLE 40 MG/1
40 CAPSULE, DELAYED RELEASE ORAL DAILY
Qty: 90 CAPSULE | Refills: 1 | Status: SHIPPED | OUTPATIENT
Start: 2021-03-02 | End: 2021-06-01

## 2021-03-02 RX ORDER — DEXTROAMPHETAMINE SACCHARATE, AMPHETAMINE ASPARTATE MONOHYDRATE, DEXTROAMPHETAMINE SULFATE AND AMPHETAMINE SULFATE 3.75; 3.75; 3.75; 3.75 MG/1; MG/1; MG/1; MG/1
15 CAPSULE, EXTENDED RELEASE ORAL DAILY
Qty: 30 CAPSULE | Refills: 0 | Status: SHIPPED | OUTPATIENT
Start: 2021-03-02 | End: 2021-06-01

## 2021-03-02 ASSESSMENT — MIFFLIN-ST. JEOR: SCORE: 1684.02

## 2021-03-02 NOTE — PROGRESS NOTES
Assessment & Plan     Epigastric pain  - Most likely gastritis  - Increase omeprazole to 40mg daily   - Will obtain a RUQ US to rule out gall bladder disease.  If US is normal will proceed with EGD  - omeprazole (PRILOSEC) 40 MG DR capsule; Take 1 capsule (40 mg) by mouth daily  - US Abdomen Limited; Future  - GASTROENTEROLOGY ADULT REF PROCEDURE ONLY; Future    ADD (attention deficit hyperactivity disorder, inattentive type)  - Adderall XR 20mg causing side effects  - Decrease dose to 15mg daily   - amphetamine-dextroamphetamine (ADDERALL XR) 15 MG 24 hr capsule; Take 1 capsule (15 mg) by mouth daily    Return in about 4 weeks (around 3/30/2021) for ADHD follow up (telephone visit).    Jazmin Guerra DO  [facility]    ======================================================================    Subjective   Milagro is a 35 year old who presents for the following health issues:    HPI     Concern - peptic ulcer  Onset: On and off issues for the past year (recurrent every 3-4 months)  Description: stomach pain and felt has if she was starving which did not resolve with eating, acid reflux  Intensity: moderate  Progression of Symptoms:  Improving with Tums  Accompanying Signs & Symptoms: nausea a little on Thursday and loss of appetite that has not come back  Previous history of similar problem: has had 3 episodes within the last 12 months  Precipitating factors:        Worsened by: None  Alleviating factors:        Improved by: Tums  Therapies tried and outcome: Tums which helped, omeprazole in the past which did help    She hasn't been taking omeprazole consistently.      Follow up ADHD: Patient is currently taking Adderall XR 20mg daily (previously took Ritalin, but Adderall was cheaper so was switched back in December).  Having some side effects from this.  Would like to consider decreasing the dose.          Review of Systems   Constitutional, HEENT, cardiovascular, pulmonary, gi  "and gu systems are negative, except as otherwise noted.      Objective    /72 (BP Location: Right arm, Patient Position: Chair, Cuff Size: Adult Large)   Pulse 114   Temp 98.5  F (36.9  C) (Oral)   Ht 1.705 m (5' 7.13\")   Wt 94.4 kg (208 lb 3.2 oz)   SpO2 95%   BMI 32.49 kg/m    Body mass index is 32.49 kg/m .  Physical Exam   GENERAL: healthy, alert and no distress  RESP: lungs clear to auscultation - no rales, rhonchi or wheezes  CV: regular rates and rhythm, normal S1 S2, no S3 or S4 and no murmur, click or rub  ABDOMEN: soft, nontender, no hepatosplenomegaly, no masses and bowel sounds normal            "

## 2021-03-09 ENCOUNTER — ANCILLARY PROCEDURE (OUTPATIENT)
Dept: ULTRASOUND IMAGING | Facility: CLINIC | Age: 36
End: 2021-03-09
Attending: FAMILY MEDICINE
Payer: COMMERCIAL

## 2021-03-09 DIAGNOSIS — R10.13 EPIGASTRIC PAIN: ICD-10-CM

## 2021-03-09 PROCEDURE — 76705 ECHO EXAM OF ABDOMEN: CPT | Performed by: RADIOLOGY

## 2021-04-02 ENCOUNTER — E-VISIT (OUTPATIENT)
Dept: URGENT CARE | Facility: CLINIC | Age: 36
End: 2021-04-02
Payer: COMMERCIAL

## 2021-04-02 DIAGNOSIS — Z20.822 SUSPECTED COVID-19 VIRUS INFECTION: Primary | ICD-10-CM

## 2021-04-02 PROCEDURE — 99421 OL DIG E/M SVC 5-10 MIN: CPT

## 2021-04-03 NOTE — PATIENT INSTRUCTIONS
Dear Milagro Ferrera,    Your symptoms show that you may have coronavirus (COVID-19). This illness can cause fever, cough and trouble breathing. Many people get a mild case and get better on their own. Some people can get very sick.    Will I be tested for COVID-19?  We would like to test you for Covid-19 virus. I have placed orders for this test.     To schedule: go to your [employer] home page and scroll down to the section that says  You have an appointment that needs to be scheduled  and click the large green button that says  Schedule Now  and follow the steps to find the next available openings.    If you are unable to complete these [employer] scheduling steps, please call [phone] to schedule your testing.     Return to work/school/ guidance:  Please let your workplace manager and staffing office know when your quarantine ends     We can t give you an exact date as it depends on the above. You can calculate this on your own or work with your manager/staffing office to calculate this. (For example if you were exposed on 10/4, you would have to quarantine for 14 full days. That would be through 10/18. You could return on 10/19.)      If you receive a positive COVID-19 test result, follow the guidance of the those who are giving you the results. Usually the return to work is 10 (or in some cases 20 days from symptom onset.) If you work at [facility], you must also be cleared by Employee Occupational Health and Safety to return to work.        If you receive a negative COVID-19 test result and did not have a high risk exposure to someone with a known positive COVID-19 test, you can return to work once you're free of fever for 24 hours without fever-reducing medication and your symptoms are improving or resolved.      If you receive a negative COVID-19 test and If you had a high risk exposure to someone who has tested positive for COVID-19 then you can return to work 14 days after your last contact  with the positive individual    Note: If you have ongoing exposure to the covid positive person, this quarantine period may be more than 14 days. (For example, if you are continued to be exposed to your child who tested positive and cannot isolate from them, then the quarantine of 7-14 days can't start until your child is no longer contagious. This is typically 10 days from onset of the child's symptoms. So the total duration may be 17-24 days in this case.)    Sign up for [employer].   We know it's scary to hear that you might have COVID-19. We want to track your symptoms to make sure you're okay over the next 2 weeks. Please look for an email from [employer]--this is a free, online program that we'll use to keep in touch. To sign up, follow the link in the email you will receive. Learn more at [email or web address]    How can I take care of myself?    Get lots of rest. Drink extra fluids (unless a doctor has told you not to)    Take Tylenol (acetaminophen) or ibuprofen for fever or pain. If you have liver or kidney problems, ask your family doctor if it's okay to take Tylenol o ibuprofen    If you have other health problems (like cancer, heart failure, an organ transplant or severe kidney disease): Call your specialty clinic if you don't feel better in the next 2 days.    Know when to call 911. Emergency warning signs include:  o Trouble breathing or shortness of breath  o Pain or pressure in the chest that doesn't go away  o Feeling confused like you haven't felt before, or not being able to wake up  o Bluish-colored lips or face    Where can I get more information?  [employer] [place] - About COVID-19:   [email or web address]    CDC - What to Do If You're Sick:   www.cdc.gov/coronavirus/2019-ncov/about/steps-when-sick.html

## 2021-04-12 ENCOUNTER — VIRTUAL VISIT (OUTPATIENT)
Dept: FAMILY MEDICINE | Facility: CLINIC | Age: 36
End: 2021-04-12
Payer: COMMERCIAL

## 2021-04-12 DIAGNOSIS — F90.0 ATTENTION DEFICIT HYPERACTIVITY DISORDER (ADHD), PREDOMINANTLY INATTENTIVE TYPE: Primary | ICD-10-CM

## 2021-04-12 DIAGNOSIS — U07.1 CLINICAL DIAGNOSIS OF COVID-19: ICD-10-CM

## 2021-04-12 DIAGNOSIS — K21.00 GASTROESOPHAGEAL REFLUX DISEASE WITH ESOPHAGITIS WITHOUT HEMORRHAGE: ICD-10-CM

## 2021-04-12 PROCEDURE — 99214 OFFICE O/P EST MOD 30 MIN: CPT | Mod: 95 | Performed by: FAMILY MEDICINE

## 2021-04-12 NOTE — PROGRESS NOTES
Milagro is a 35 year old who is being evaluated via a billable video visit.      How would you like to obtain your AVS? MyChart  If the video visit is dropped, the invitation should be resent by: Text to cell phone: [phone]  Will anyone else be joining your video visit? No    ========================================================================    Assessment & Plan     ADD (attention deficit hyperactivity disorder, inattentive type)  - Previously noted insomnia when taking Adderall XR 20 mg pills so we decreased to 15 mg.  She has continued to notice insomnia, but also notes recurrence of her ADHD symptoms on the lower dose.  She would like to experiment with taking the 20mg pills earlier in the day (previously taking around 9-10am).  She has about a month of the 20 mg pills left so will try that and we will talk again in 4 weeks or so     Gastroesophageal reflux disease with esophagitis without hemorrhage  - Continue omeprazole daily  - Consider EGD if symptoms return     Clinical diagnosis of COVID-19  - Resolving       Return in about 4 weeks (around 5/10/2021) for ADHD.    Jazmin Guerra, [facility]    =====================================================================    Subjective   Milagro is a 35 year old who presents for the following health issues    HPI     Medication Followup of Adderall    Taking Medication as prescribed: yes    Side Effects:  Pt is still having insomnia    Medication Helping Symptoms:  yes     Last month we decreased her Adderall dose to 15 mg daily due to it causing side effects.  She has continued to notice issues with insomnia.  She notes that she often takes it around 9-10am.      Previously had issues with GERD.  We were considering EGD, however her symptoms resolved with omeprazole.  She denies any gastritis symptoms at this time.      She was unfortunately diagnosed with COVID-19 a couple weeks ago.  Exposed by her family and had  significant symptoms for a few days, but now recovering well.  Still has a bit of a cough every now and then, but otherwise feels well.      Review of Systems   Constitutional, HEENT, cardiovascular, pulmonary, gi and gu systems are negative, except as otherwise noted.      Objective           Vitals:  No vitals were obtained today due to virtual visit.    Physical Exam   GENERAL: Healthy, alert and no distress  EYES: Eyes grossly normal to inspection.  No discharge or erythema, or obvious scleral/conjunctival abnormalities.  RESP: No audible wheeze, cough, or visible cyanosis.  No visible retractions or increased work of breathing.    SKIN: Visible skin clear. No significant rash, abnormal pigmentation or lesions.  NEURO: Cranial nerves grossly intact.  Mentation and speech appropriate for age.  PSYCH: Mentation appears normal, affect normal/bright, judgement and insight intact, normal speech and appearance well-groomed.          Video-Visit Details    Type of service:  Video Visit    Video Start Time: 9:43 AM   Video End Time: 9:53 AM     Originating Location (pt. Location): Home    Distant Location (provider location):  [facility]     Platform used for Video Visit: [employer]

## 2021-05-09 ENCOUNTER — HEALTH MAINTENANCE LETTER (OUTPATIENT)
Age: 36
End: 2021-05-09

## 2021-06-01 ENCOUNTER — OFFICE VISIT (OUTPATIENT)
Dept: FAMILY MEDICINE | Facility: CLINIC | Age: 36
End: 2021-06-01
Payer: COMMERCIAL

## 2021-06-01 VITALS
BODY MASS INDEX: 31.33 KG/M2 | DIASTOLIC BLOOD PRESSURE: 68 MMHG | HEART RATE: 121 BPM | OXYGEN SATURATION: 97 % | SYSTOLIC BLOOD PRESSURE: 112 MMHG | HEIGHT: 67 IN | WEIGHT: 199.6 LBS | TEMPERATURE: 98.4 F

## 2021-06-01 DIAGNOSIS — F41.1 GAD (GENERALIZED ANXIETY DISORDER): ICD-10-CM

## 2021-06-01 DIAGNOSIS — F32.1 MODERATE MAJOR DEPRESSION (H): ICD-10-CM

## 2021-06-01 DIAGNOSIS — Z80.3 FAMILY HISTORY OF MALIGNANT NEOPLASM OF BREAST: ICD-10-CM

## 2021-06-01 DIAGNOSIS — F90.0 ATTENTION DEFICIT HYPERACTIVITY DISORDER (ADHD), PREDOMINANTLY INATTENTIVE TYPE: ICD-10-CM

## 2021-06-01 DIAGNOSIS — Z00.00 ROUTINE GENERAL MEDICAL EXAMINATION AT A HEALTH CARE FACILITY: Primary | ICD-10-CM

## 2021-06-01 DIAGNOSIS — R10.13 EPIGASTRIC PAIN: ICD-10-CM

## 2021-06-01 DIAGNOSIS — Z72.4 INAPPROPRIATE DIET OR EATING HABITS: ICD-10-CM

## 2021-06-01 DIAGNOSIS — Z80.49 FAMILY HISTORY OF UTERINE CANCER: ICD-10-CM

## 2021-06-01 DIAGNOSIS — Z80.42 FAMILY HISTORY OF PROSTATE CANCER: ICD-10-CM

## 2021-06-01 DIAGNOSIS — Z80.0 FAMILY HISTORY OF PANCREATIC CANCER: ICD-10-CM

## 2021-06-01 DIAGNOSIS — Z71.85 VACCINE COUNSELING: ICD-10-CM

## 2021-06-01 PROCEDURE — 96127 BRIEF EMOTIONAL/BEHAV ASSMT: CPT | Performed by: FAMILY MEDICINE

## 2021-06-01 PROCEDURE — 99395 PREV VISIT EST AGE 18-39: CPT | Performed by: FAMILY MEDICINE

## 2021-06-01 PROCEDURE — 99213 OFFICE O/P EST LOW 20 MIN: CPT | Mod: 25 | Performed by: FAMILY MEDICINE

## 2021-06-01 RX ORDER — SERTRALINE HYDROCHLORIDE 100 MG/1
100 TABLET, FILM COATED ORAL DAILY
Qty: 90 TABLET | Refills: 3 | Status: SHIPPED | OUTPATIENT
Start: 2021-06-01 | End: 2022-08-16

## 2021-06-01 RX ORDER — OMEPRAZOLE 40 MG/1
40 CAPSULE, DELAYED RELEASE ORAL DAILY
Qty: 90 CAPSULE | Refills: 1 | Status: SHIPPED | OUTPATIENT
Start: 2021-06-01 | End: 2021-09-15

## 2021-06-01 RX ORDER — ATOMOXETINE 25 MG/1
25 CAPSULE ORAL DAILY
Qty: 90 CAPSULE | Refills: 0 | Status: SHIPPED | OUTPATIENT
Start: 2021-06-01 | End: 2021-09-15

## 2021-06-01 ASSESSMENT — ENCOUNTER SYMPTOMS
ABDOMINAL PAIN: 0
DYSURIA: 0
NAUSEA: 0
HEADACHES: 0
DIARRHEA: 0
CONSTIPATION: 0
NERVOUS/ANXIOUS: 0
HEARTBURN: 0
WEAKNESS: 0
FREQUENCY: 0
EYE PAIN: 0
DIZZINESS: 1
SORE THROAT: 0
ARTHRALGIAS: 1
JOINT SWELLING: 0
HEMATURIA: 0
SHORTNESS OF BREATH: 0
FEVER: 0
CHILLS: 0
BREAST MASS: 0
HEMATOCHEZIA: 0
COUGH: 0
MYALGIAS: 0
PARESTHESIAS: 0
PALPITATIONS: 0

## 2021-06-01 ASSESSMENT — PATIENT HEALTH QUESTIONNAIRE - PHQ9: SUM OF ALL RESPONSES TO PHQ QUESTIONS 1-9: 8

## 2021-06-01 ASSESSMENT — MIFFLIN-ST. JEOR: SCORE: 1648.13
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
## 2021-06-08 NOTE — TELEPHONE ENCOUNTER
RECORDS STATUS - ALL OTHER DIAGNOSIS      RECORDS RECEIVED FROM: family history of breast ca, pancreatic ca, uterine ca and prostate ca, ref by Dr Jazmin Rojas   DATE RECEIVED: 7.13.21   NOTES STATUS DETAILS   OFFICE NOTE from referring provider Internal 6.1.21 Dr Jazmin Rojas, [facility]   OFFICE NOTE from medical oncologist     DISCHARGE SUMMARY from hospital     DISCHARGE REPORT from the ER     OPERATIVE REPORT     MEDICATION LIST Internal    CLINICAL TRIAL TREATMENTS TO DATE     LABS     PATHOLOGY REPORTS     ANYTHING RELATED TO DIAGNOSIS Internal    GENONOMIC TESTING     TYPE:     IMAGING (NEED IMAGES & REPORT)     CT SCANS     MRI     MAMMO     ULTRASOUND     PET

## 2021-07-13 ENCOUNTER — VIRTUAL VISIT (OUTPATIENT)
Dept: ONCOLOGY | Facility: CLINIC | Age: 36
End: 2021-07-13
Attending: FAMILY MEDICINE
Payer: COMMERCIAL

## 2021-07-13 ENCOUNTER — PRE VISIT (OUTPATIENT)
Dept: ONCOLOGY | Facility: CLINIC | Age: 36
End: 2021-07-13

## 2021-07-13 DIAGNOSIS — Z80.42 FAMILY HISTORY OF PROSTATE CANCER: ICD-10-CM

## 2021-07-13 DIAGNOSIS — Z80.3 FAMILY HISTORY OF MALIGNANT NEOPLASM OF BREAST: Primary | ICD-10-CM

## 2021-07-13 DIAGNOSIS — Z80.8 FAMILY HISTORY OF GLIOBLASTOMA: ICD-10-CM

## 2021-07-13 PROCEDURE — 96040 HC GENETIC COUNSELING, EACH 30 MINUTES: CPT | Mod: GT | Performed by: GENETIC COUNSELOR, MS

## 2021-07-13 NOTE — PROGRESS NOTES
7/13/2021    Milagro is a 35 year old who is being evaluated via a billable video visit.      How would you like to obtain your AVS? MyChart  If the video visit is dropped, the invitation should be resent by: Text to cell phone: [phone]  Will anyone else be joining your video visit? No    Video-Visit Details  Type of service:  Video Visit  Video Start Time: 2:12pm  Video End Time:3:08pm  Originating Location (pt. Location): Home  Distant Location (provider location):  [facility]   Platform used for Video Visit: Ignyta    Referring Provider: Jazmin Rojas DO    Presenting Information:   Given concerns regarding the potential for COVID-19 exposure during a clinic visit, Milagro elected for a video genetic counseling visit through the Cancer Risk Management Program to discuss her family history of breast, prostate, and other cancers. We reviewed this history, cancer screening recommendations, and available genetic testing options.    Personal History:  Milagro is a 35 year old female. She does not have any personal history of cancer.    She had her first menstrual period at age 14, does not have biological children, and is premenopausal. Milagro has her ovaries, fallopian tubes and uterus in place, and she has had no ovarian cancer screening to date. She reports that she has never used hormone replacement therapy.      Milagro has not had a mammogram or colonoscopy and does not regularly do any other cancer screening at this time.    Family History: (Please see scanned pedigree for detailed family history information)    Milagro's mother is 58 and was diagnosed with both cervical cancer and small cell lung cancer in her 30's; she does not have a history of smoking. Of note, Milagro reports that she has limited contact with her mother and extended maternal relatives.    Two maternal aunts are in their 40/50's and have been diagnosed with breast cancer at unknown  ages.    Milagro's maternal grandmother was diagnosed with a melanoma in her 40/50's and passed away at an unknown age.    Milagro's father was diagnosed with and passed away from a glioblastoma at age 46.    Milagro's paternal grandmother was diagnosed with an osteosarcoma at age 76 and passed away at age 86.    Milagro's paternal grandfather was diagnosed with prostate cancer at age 68 and non-Hodgkin's lymphoma before passing away at age 85.    Three of his brothers were diagnosed with prostate cancer in their 60's.    One of his sisters was diagnosed with lung cancer and passed away; she had a history of smoking.    His father was diagnosed with prostate cancer in his 70's and passed away.    Her maternal ethnicity is Kiswahili, English, and Adri[place]. Her paternal ethnicity is Scandinavian an Kiswahili. There is no known Ashkenazi Worship ancestry on either side of her family.    Discussion:    We reviewed the features of sporadic, familial, and hereditary cancers. In looking at Milagro's family history, it is possible that a cancer susceptibility gene is present as three of her maternal relatives have been diagnosed with breast or a related cancer (i.e. melanoma); these relatives may have also been diagnosed in their 50's or younger. Milagro's paternal family history of cancer may also be consistent with hereditary cancer, as she has multiple relatives with prostate and/or possibly related cancers in three generations (i.e. glioblastoma, sarcoma).     We discussed the natural history and genetics of hereditary breast and prostate cancer, including Hereditary Breast and Ovarian Cancer (HBOC) syndrome.     We reviewed that the most common cause of hereditary breast and prostate cancer is HBOC syndrome, which is caused by mutations in the BRCA1 and BRCA2 genes. Individuals with HBOC syndrome are at increased risk for several different cancers, including breast, ovarian, male breast, prostate, melanoma, and pancreatic  cancer.    We discussed that there are additional genes that could cause increased risk for the cancers in her family. For example, individuals with Li-Fraumeni syndrome (due to a TP53 mutation) are at increased risk for multiple cancers, including glioblastomas and sarcomas. As many of these genes present with overlapping features in a family and accurate cancer risk cannot always be established based upon the pedigree analysis alone, it would be reasonable for Milagro to consider panel genetic testing to analyze multiple genes at once.    We reviewed that based on available information, it is unclear if Milagro meets current National Comprehensive Cancer Network (NCCN) criteria for genetic testing. If either of her maternal aunts with breast cancer were diagnosed at age 50 or under, though, she would meet NCCN guidelines for genetic testing of the high penetrance breast cancer genes (i.e. MONTEZ, BRCA1, BRCA2, CDH1, CHEK2, PALB2, PTEN, TP53, etc.).      A detailed handout regarding these genes/syndromes and the information we discussed was provided to Milagro via [employer] and can be found in the after visit summary. Topics included: inheritance pattern, cancer risks, cancer screening recommendations, and also risks, benefits and limitations of testing.    We discussed that genetic testing for cancer susceptibility genes is typically most informative, though, when it is first performed on a family member with a personal history of cancer. Testing is available to Milagro, but with limitations. If Milagro pursues testing at this time and receives a negative result, this does not rule out the possibility of a hereditary cancer syndrome in her and/or her family. Despite these limitations and given that her parents are not available for testing (her father has passed away and she has limited contacted with her mother), Milagro expressed interest in proceeding with testing herself.    We reviewed genetic testing  options for hereditary breast, prostate, and related cancers: actionable breast/gynecologic/prostate cancer panel ([facility] Breast and Gyn Cancers Guidelines-Based Panel + Prostate Cancer Panel) and expanded multi-cancer panels (such as the [facility]ae Common Hereditary Cancers Panel). Milagro expressed interest in comprehensive testing that addresses the majority of cancers in her family and would inform her cancer screening. She opted for the [facility] Common Hereditary Cancers Panel.    Genetic testing is available for 47 genes associated with cancers of the breast, ovary, uterus, prostate, and gastrointestinal system: [facility] Common Hereditary Cancers Panel (APC, MONTEZ, AXIN2, BARD1, BMPR1A, BRCA1, BRCA2, BRIP1, CDH1, CDK4, CDKN2A, CHEK2, CTNNA1, DICER1, EPCAM, GREM1, HOXB13, KIT, MEN1, MLH1, MSH2, MSH3, MSH6, MUTYH, NBN, NF1, NTHL1, PALB2, PDGFRA, PMS2, POLD1, POLE, PTEN,RAD50, RAD51C, RAD51D, SDHA, SDHB, SDHC, SDHD, SMAD4, SMARCA4, STK11, TP53,TSC1, TSC2, VHL).      We discussed that many of these genes are associated with specific hereditary cancer syndromes and published management guidelines: Hereditary Breast and Ovarian Cancer syndrome (BRCA1, BRCA2), Salguero syndrome (MLH1, MSH2, MSH6, PMS2, EPCAM), Familial Adenomatous Polyposis (APC), Hereditary Diffuse Gastric Cancer (CDH1), Familial Atypical Multiple Mole Melanoma syndrome (CDK4, CDKN2A), Multiple Endocrine Neoplasia type 1 (MEN1), Juvenile Polyposis syndrome (BMPR1A, SMAD4), Cowden syndrome (PTEN), Li Fraumeni syndrome (TP53), Hereditary Paraganglioma and Pheochromocytoma syndrome (SDHA, SDHB, SDHC, SDHD), Peutz-Jeghers syndrome (STK11), MUTYH Associated Polyposis (MUTYH), Tuberous sclerosis complex (TSC1, TSC2), Von Hippel-Lindau disease (VHL), and Neurofibromatosis type 1 (NF1).     The MONTEZ, AXIN2, BRIP1, CHEK2, GREM1, MSH3, NBN, NTHL1, PALB2, POLD1, POLE, RAD51C, and RAD51D genes are associated with increased cancer risk and have published management  guidelines for certain cancers.     The remaining genes (BARD1, CTNNA1, DICER1, HOXB13, KIT, PDGFRA, RAD50, and SMARCA4) are associated with increased cancer risk and may allow us to make medical recommendations when mutations are identified.    Consent was obtained over the video. Milagro elected to have a saliva collection kit shipped to her home directly. Once her saliva sample is collected, genetic testing using the Common Hereditary Cancers Panel will be sent to [facility]. Turn around time: 2-3 weeks after [facility].    Medical Management: For Milagro, we reviewed that the information from genetic testing may determine:    additional cancer screening for which Milagro may qualify (i.e. mammogram and breast MRI, more frequent colonoscopies, more frequent dermatologic exams, etc.),    options for risk reducing surgeries Milagro could consider (i.e. bilateral mastectomy, surgery to remove her ovaries and/or uterus, etc.),      and targeted chemotherapies if she were to develop certain cancers in the future (i.e. immunotherapy for individuals with Salguero syndrome, PARP inhibitors, etc.).     These recommendations and possible targeted chemotherapies will be discussed in detail once genetic testing is completed.     Plan:  1) Today Milagro elected to proceed with genetic testing using the Common Hereditary Cancers Panel offered by [facility]. A saliva collection kit will be shipped to her home directly.  2) The results should be available 2-3 weeks after [facility].  3) I will contact Milagro by either telephone or video to discuss the results.    Yvonne Epps MS, [facility] [place]  Licensed, Certified Genetic Counselor  Office: [phone]  Pager: [phone]

## 2021-07-13 NOTE — LETTER
7/13/2021         RE: Milagro Ferrera  [address] [place] [address] [address]  [facility] [id number]        Dear Colleague,    Thank you for referring your patient, Milagro Ferrera, to the [facility]. Please see a copy of my visit note below.    7/13/2021    Milagro is a 35 year old who is being evaluated via a billable video visit.      How would you like to obtain your AVS? MyChart  If the video visit is dropped, the invitation should be resent by: Text to cell phone: [phone]  Will anyone else be joining your video visit? No    Video-Visit Details  Type of service:  Video Visit  Video Start Time: 2:12pm  Video End Time:3:08pm  Originating Location (pt. Location): Home  Distant Location (provider location):  [facility]   Platform used for Video Visit: [employer]    Referring Provider: Jazmin Rojas DO    Presenting Information:   Given concerns regarding the potential for COVID-19 exposure during a clinic visit, Milagro elected for a video genetic counseling visit through the Cancer Risk Management Program to discuss her family history of breast, prostate, and other cancers. We reviewed this history, cancer screening recommendations, and available genetic testing options.    Personal History:  Milagro is a 35 year old female. She does not have any personal history of cancer.    She had her first menstrual period at age 14, does not have biological children, and is premenopausal. Milagro has her ovaries, fallopian tubes and uterus in place, and she has had no ovarian cancer screening to date. She reports that she has never used hormone replacement therapy.      Milagro has not had a mammogram or colonoscopy and does not regularly do any other cancer screening at this time.    Family History: (Please see scanned pedigree for detailed family history information)    Milagro's mother is 58 and was diagnosed with both cervical cancer and small cell lung  cancer in her 30's; she does not have a history of smoking. Of note, Milagro reports that she has limited contact with her mother and extended maternal relatives.    Two maternal aunts are in their 40/50's and have been diagnosed with breast cancer at unknown ages.    Milagro's maternal grandmother was diagnosed with a melanoma in her 40/50's and passed away at an unknown age.    Milagro's father was diagnosed with and passed away from a glioblastoma at age 46.    Milagro's paternal grandmother was diagnosed with an osteosarcoma at age 76 and passed away at age 86.    Milagro's paternal grandfather was diagnosed with prostate cancer at age 68 and non-Hodgkin's lymphoma before passing away at age 85.    Three of his brothers were diagnosed with prostate cancer in their 60's.    One of his sisters was diagnosed with lung cancer and passed away; she had a history of smoking.    His father was diagnosed with prostate cancer in his 70's and passed away.    Her maternal ethnicity is Lebanese, English, and American. Her paternal ethnicity is Scandinavian an Lebanese. There is no known Ashkenazi Yarsani ancestry on either side of her family.    Discussion:    We reviewed the features of sporadic, familial, and hereditary cancers. In looking at Milagro's family history, it is possible that a cancer susceptibility gene is present as three of her maternal relatives have been diagnosed with breast or a related cancer (i.e. melanoma); these relatives may have also been diagnosed in their 50's or younger. Milagro's paternal family history of cancer may also be consistent with hereditary cancer, as she has multiple relatives with prostate and/or possibly related cancers in three generations (i.e. glioblastoma, sarcoma).     We discussed the natural history and genetics of hereditary breast and prostate cancer, including Hereditary Breast and Ovarian Cancer (HBOC) syndrome.     We reviewed that the most common cause of hereditary  breast and prostate cancer is HBOC syndrome, which is caused by mutations in the BRCA1 and BRCA2 genes. Individuals with HBOC syndrome are at increased risk for several different cancers, including breast, ovarian, male breast, prostate, melanoma, and pancreatic cancer.    We discussed that there are additional genes that could cause increased risk for the cancers in her family. For example, individuals with Li-Fraumeni syndrome (due to a TP53 mutation) are at increased risk for multiple cancers, including glioblastomas and sarcomas. As many of these genes present with overlapping features in a family and accurate cancer risk cannot always be established based upon the pedigree analysis alone, it would be reasonable for Milagro to consider panel genetic testing to analyze multiple genes at once.    We reviewed that based on available information, it is unclear if Milagro meets current National Comprehensive Cancer Network (NCCN) criteria for genetic testing. If either of her maternal aunts with breast cancer were diagnosed at age 50 or under, though, she would meet NCCN guidelines for genetic testing of the high penetrance breast cancer genes (i.e. MONTEZ, BRCA1, BRCA2, CDH1, CHEK2, PALB2, PTEN, TP53, etc.).      A detailed handout regarding these genes/syndromes and the information we discussed was provided to Milagro via [employer] and can be found in the after visit summary. Topics included: inheritance pattern, cancer risks, cancer screening recommendations, and also risks, benefits and limitations of testing.    We discussed that genetic testing for cancer susceptibility genes is typically most informative, though, when it is first performed on a family member with a personal history of cancer. Testing is available to Milagro, but with limitations. If Milagro pursues testing at this time and receives a negative result, this does not rule out the possibility of a hereditary cancer syndrome in her and/or her  family. Despite these limitations and given that her parents are not available for testing (her father has passed away and she has limited contacted with her mother), Milagro expressed interest in proceeding with testing herself.    We reviewed genetic testing options for hereditary breast, prostate, and related cancers: actionable breast/gynecologic/prostate cancer panel ([facility]ae Breast and Gyn Cancers Guidelines-Based Panel + Prostate Cancer Panel) and expanded multi-cancer panels (such as the [facility] Common Hereditary Cancers Panel). Milagro expressed interest in comprehensive testing that addresses the majority of cancers in her family and would inform her cancer screening. She opted for the [facility] Common Hereditary Cancers Panel.    Genetic testing is available for 47 genes associated with cancers of the breast, ovary, uterus, prostate, and gastrointestinal system: [facility] Common Hereditary Cancers Panel (APC, MONTEZ, AXIN2, BARD1, BMPR1A, BRCA1, BRCA2, BRIP1, CDH1, CDK4, CDKN2A, CHEK2, CTNNA1, DICER1, EPCAM, GREM1, HOXB13, KIT, MEN1, MLH1, MSH2, MSH3, MSH6, MUTYH, NBN, NF1, NTHL1, PALB2, PDGFRA, PMS2, POLD1, POLE, PTEN,RAD50, RAD51C, RAD51D, SDHA, SDHB, SDHC, SDHD, SMAD4, SMARCA4, STK11, TP53,TSC1, TSC2, VHL).      We discussed that many of these genes are associated with specific hereditary cancer syndromes and published management guidelines: Hereditary Breast and Ovarian Cancer syndrome (BRCA1, BRCA2), Salguero syndrome (MLH1, MSH2, MSH6, PMS2, EPCAM), Familial Adenomatous Polyposis (APC), Hereditary Diffuse Gastric Cancer (CDH1), Familial Atypical Multiple Mole Melanoma syndrome (CDK4, CDKN2A), Multiple Endocrine Neoplasia type 1 (MEN1), Juvenile Polyposis syndrome (BMPR1A, SMAD4), Cowden syndrome (PTEN), Li Fraumeni syndrome (TP53), Hereditary Paraganglioma and Pheochromocytoma syndrome (SDHA, SDHB, SDHC, SDHD), Peutz-Jeghers syndrome (STK11), MUTYH Associated Polyposis (MUTYH), Tuberous sclerosis complex  (TSC1, TSC2), Von Hippel-Lindau disease (VHL), and Neurofibromatosis type 1 (NF1).     The MONTEZ, AXIN2, BRIP1, CHEK2, GREM1, MSH3, NBN, NTHL1, PALB2, POLD1, POLE, RAD51C, and RAD51D genes are associated with increased cancer risk and have published management guidelines for certain cancers.     The remaining genes (BARD1, CTNNA1, DICER1, HOXB13, KIT, PDGFRA, RAD50, and SMARCA4) are associated with increased cancer risk and may allow us to make medical recommendations when mutations are identified.    Consent was obtained over the video. Milagro elected to have a saliva collection kit shipped to her home directly. Once her saliva sample is collected, genetic testing using the Common Hereditary Cancers Panel will be sent to [facility]. Turn around time: 2-3 weeks after [facility].    Medical Management: For Milagro, we reviewed that the information from genetic testing may determine:    additional cancer screening for which Milagro may qualify (i.e. mammogram and breast MRI, more frequent colonoscopies, more frequent dermatologic exams, etc.),    options for risk reducing surgeries Milagro could consider (i.e. bilateral mastectomy, surgery to remove her ovaries and/or uterus, etc.),      and targeted chemotherapies if she were to develop certain cancers in the future (i.e. immunotherapy for individuals with Salguero syndrome, PARP inhibitors, etc.).     These recommendations and possible targeted chemotherapies will be discussed in detail once genetic testing is completed.     Plan:  1) Today Milagro elected to proceed with genetic testing using the Common Hereditary Cancers Panel offered by [facility]. A saliva collection kit will be shipped to her home directly.  2) The results should be available 2-3 weeks after [facility] receives her saliva sample.  3) I will contact Milagro by either telephone or video to discuss the results.    Yvonne Epps MS, CGC  Licensed, Certified  Genetic Counselor  Office: [phone]  Pager: [phone]      Again, thank you for allowing me to participate in the care of your patient.        Sincerely,        Yvonne Epps GC

## 2021-07-21 NOTE — PATIENT INSTRUCTIONS
Assessing Cancer Risk  Only about 5-10% of cancers are thought to be due to an inherited cancer susceptibility gene.    These families often have:    Several people with the same or related types of cancer    Cancers diagnosed at a young age (before age 50)    Individuals with more than one primary cancer    Multiple generations of the family affected with cancer    Some people may be candidates for genetic testing of more than one gene.  For these families, genetic testing using a cancer panel may be offered.  These panels will test different genes known to increase the risk for breast, ovarian, uterine, and/or other cancers. All of the genes discussed below have published clinical management guidelines for individuals who are found to carry a mutation. The purpose of this handout is to serve as a brief summary of the genes analyzed by the panels used to inquire about hereditary breast and gynecologic cancer:  MONTEZ, BRCA1, BRCA2, BRIP1, CDH1, CHEK2, MLH1, MSH2, MSH6, PMS2, EPCAM, PTEN, PALB2, RAD51C, RAD51D, and TP53.  ______________________________________________________________________________  Hereditary Breast and Ovarian Cancer Syndrome   (BRCA1 and BRCA2)  A single mutation in one of the copies of BRCA1 or BRCA2 increases the risk for breast and ovarian cancer, among others.  The risk for pancreatic cancer and melanoma may also be slightly increased in some families.  The chart below shows the chance that someone with a BRCA mutation would develop cancer in his or her lifetime1,2,3,4.        A person s ethnic background is also important to consider, as individuals of Ashkenazi Mandaeism ancestry have a higher chance of having a BRCA gene mutation.  There are three BRCA mutations that occur more frequently in this population.    Salguero Syndrome   (MLH1, MSH2, MSH6, PMS2, and EPCAM)  Currently five genes are known to cause Salguero Syndrome: MLH1, MSH2, MSH6, PMS2, and EPCAM.  A single mutation in one of the  Salguero Syndrome genes increases the risk for colon, endometrial, ovarian, and stomach cancers.  Other cancers that occur less commonly in Salguero Syndrome include urinary tract, skin, and brain cancers.  The chart below shows the chance that a person with Salguero syndrome would develop cancer in his or her lifetime5.      *Cancer risk varies depending on Salguero syndrome gene found    Cowden Syndrome   (PTEN)  Cowden syndrome is a hereditary condition that increases the risk for breast, thyroid, endometrial, colon, and kidney cancer.  Cowden syndrome is caused by a mutation in the PTEN gene.  A single mutation in one of the copies of PTEN causes Cowden syndrome and increases cancer risk.  The chart below shows the chance that someone with a PTEN mutation would develop cancer in their lifetime6,7.  Other benign features seen in some individuals with Cowden syndrome include benign skin lesions (facial papules, keratoses, lipomas), learning disability, autism, thyroid nodules, colon polyps, and larger head size.      *One recent study found breast cancer risk to be increased to 85%    Li-Fraumeni Syndrome   (TP53)  Li-Fraumeni Syndrome (LFS) is a cancer predisposition syndrome caused by a mutation in the TP53 gene. A single mutation in one of the copies of TP53 increases the risk for multiple cancers. Individuals with LFS are at an increased risk for developing cancer at a young age. The lifetime risk for development of a LFS-associated cancer is 50% by age 30 and 90% by age 60.   Core Cancers: Sarcomas, Breast, Brain, Lung, Leukemias/Lymphomas, Adrenocortical carcinomas  Other Cancers: Gastrointestinal, Thyroid, Skin, Genitourinary    Hereditary Diffuse Gastric Cancer   (CDH1)  Currently, one gene is known to cause hereditary diffuse gastric cancer (HDGC): CDH1.  Individuals with HDGC are at increased risk for diffuse gastric cancer and lobular breast cancer. Of people diagnosed with HDGC, 30-50% have a mutation in the CDH1  gene.  This suggests there are likely other genes that may cause HDGC that have not been identified yet.      Lifetime Cancer Risks    General Population HDGC    Diffuse Gastric  <1% ~80%   Breast 12% 39-52%         Additional Genes  MONTEZ  MONTEZ is a moderate-risk breast cancer gene. Women who have a mutation in MONTEZ can have between a 2-4 fold increased risk for breast cancer compared to the general population8. MONTEZ mutations have also been associated with increased risk for pancreatic cancer, however an estimate of this cancer risk is not well understood9. Individuals who inherit two MONTEZ mutations have a condition called ataxia-telangiectasia (AT).  This rare autosomal recessive condition affects the nervous system and immune system, and is associated with progressive cerebellar ataxia beginning in childhood.  Individuals with ataxia-telangiectasia often have a weakened immune system and have an increased risk for childhood cancers.    PALB2  Mutations in PALB2 have been shown to increase the risk of breast cancer up to 33-58% in some families; where individuals fall within this risk range is dependent upon family [email or web address]. PALB2 mutations have also been associated with increased risk for pancreatic cancer, although this risk has not been quantified yet.  Individuals who inherit two PALB2 mutations--one from their mother and one from their father--have a condition called Fanconi Anemia.  This rare autosomal recessive condition is associated with short stature, developmental delay, bone marrow failure, and increased risk for childhood cancers.    CHEK2   CHEK2 is a moderate-risk breast cancer gene.  Women who have a mutation in CHEK2 have around a 2-fold increased risk for breast cancer compared to the general population, and this risk may be higher depending upon family history.11,12,13 Mutations in CHEK2 have also been shown to increase the risk of a number of other cancers, including colon and prostate, however  these cancer risks are currently not well understood.    BRIP1, RAD51C and RAD51D  Mutations in BRIP1, RAD51C, and RAD51D have been shown to increase the risk of ovarian cancer and possibly female breast cancer as well14,15 .       Lifetime Cancer Risk    General Population BRIP1 RAD51C RAD51D   Ovarian 1-2% ~5-8% ~5-9% ~7-15%           Inheritance  All of the cancer syndromes reviewed above are inherited in an autosomal dominant pattern.  This means that if a parent has a mutation, each of his or her children will have a 50% chance of inheriting that same mutation.  Therefore, each child--male or female--would have a 50% chance of being at increased risk for developing cancer.      Image obtained from Genetics Home Reference, 2013     Mutations in some genes can occur de flash, which means that a person s mutation occurred for the first time in them and was not inherited from a parent.  Now that they have the mutation, however, it can be passed on to future generations.    Genetic Testing  Genetic testing involves a blood test and will look at the genetic information in the MONTEZ, BRCA1, BRCA2, BRIP1, CDH1, CHEK2, MLH1, MSH2, MSH6, PMS2, EPCAM, PTEN, PALB2, RAD51C, RAD51D, and TP53 genes for any harmful mutations that are associated with increased cancer risk.  If possible, it is recommended that the person(s) who has had cancer be tested before other family members.  That person will give us the most useful information about whether or not a specific gene is associated with the cancer in the family.    Results  There are three possible results of genetic testing:    Positive--a harmful mutation was identified in one or more of the genes    Negative--no mutation was identified in any of the genes on this panel    Variant of unknown significance--a variation in one of the genes was identified, but it is unclear how this impacts cancer risk in the family    Advantages and Disadvantages   There are advantages and  disadvantages to genetic testing.    Advantages    May clarify your cancer risk    Can help you make medical decisions    May explain the cancers in your family    May give useful information to your family members (if you share your results)    Disadvantages    Possible negative emotional impact of learning about inherited cancer risk    Uncertainty in interpreting a negative test result in some situations    Possible genetic discrimination concerns (see below)    Genetic Information Nondiscrimination Act (JORDIN)  JORDIN is a federal law that protects individuals from health insurance or employment discrimination based on a genetic test result alone.  Although rare, there are currently no legal discrimination protections in terms of life insurance, long term care, or disability insurances.  Visit the [employer] website to learn more.    Reducing Cancer Risk  All of the genes described above have nationally recognized cancer screening guidelines that would be recommended for individuals who test positive.  In addition to increased cancer screening, surgeries may be offered or recommended to reduce cancer risk.  Recommendations are based upon an individual s genetic test result as well as their personal and family history of cancer.    Questions to Think About Regarding Genetic Testing:    What effect will the test result have on me and my relationship with my family members if I have an inherited gene mutation?  If I don t have a gene mutation?    Should I share my test results, and how will my family react to this news, which may also affect them?    Are my children ready to learn new information that may one day affect their own health?    Hereditary Cancer Resources    FORCE: Facing Our Risk of Cancer Empowered facingourrisk.org   Bright Pink bebrightpink.org   Li-Fraumeni Syndrome Association lfsassociation.org   PTEN World PTENworld.com   No stomach for cancer, Inc.  nostomachforcancer.org   Stomach cancer relief network Scrnet.org   Collaborative Group of the Americas on Inherited Colorectal Cancer (CGA) cgaicc.com    Cancer Care cancercare.org   American Cancer Society (ACS) cancer.org   National Cancer [place] (NCI) cancer.gov     Please call us if you have any questions or concerns.   Cancer Risk Management Program [phone] ([phone])  ? [facility] [phone]  ? [facility] Pandey[facility]  [phone]  ? Kyl[facility] Dvais[facility], [facility]  [phone]  ? [facility] Epps[facility] [phone]  ? [facility] Radha[facility] [place] [phone]  ? [facility] Crowe, [facility], [facility]  [phone]    References  1. Christine A, César PDP, Ade S, Adia GALVAN, Александр JE, Kj JL, Blayne N, Keli H, Bibi O, Teri A, Tomás B, Antonio P, Mannemesio S, Akin DM, Aceves N, Lorna E, Corry H, Huber E, Yvette J, Gronfigueroa J, Shayla B, Rebeca H, Thorlacius S, Eerola H, Sohail H, Devyn K, Ariel OP. Average risks of breast and ovarian cancer associated with BRCA1 or BRCA2 mutations detected in case series unselected for family history: a combined analysis of 222 studies. Am J Hum Yolanda. 2003;72:1117-30.  2. Lydia LEON, Rosario M, Taylor G.  BRCA1 and BRCA2 Hereditary Breast and Ovarian Cancer. Gene Reviews online. 2013.  3. Billy YC, Driss S, Prakash G, Lamas S. Breast cancer risk among male BRCA1 and BRCA2 mutation carriers. J Natl Cancer Inst. 2007;99:1811-4.  4. Patrick MARIO, Sudha I, Cristobal J, Maria E, Red ER, Catrachito F. Risk of breast cancer in male BRCA2 carriers. J Med Yolanda. 2010;47:710-1.  5. National Comprehensive Cancer Network. Clinical practice guidelines in oncology, colorectal cancer screening. Available online (registration required). 2015.  6. Alexei MORA, Florentin J, Presley J, Bina LA, Andrzej MAYFIELD, Kemal C. Lifetime cancer risks in individuals with germline PTEN mutations. Clin Cancer Res. 2012;18:400-7.  7. Tomas DECKER. Cowden Syndrome: A Critical Review of the  Clinical Literature. J Yolanda . 2009:18:13-27.  8. Oliver A, Rambo D, Adriane S, Tena P, Poonam T, Yohannes M, Jose B, Chapin H, Rosa Isela R, Eugenia K, Nasrin L, Patrick DG, Akin D, Ponce DF, Suraj MR, The Breast Cancer Susceptibility Collaboration () & Dru LEON. MONTEZ mutations that cause ataxia-telangiectasia are breast cancer susceptibility alleles. Nature Genetics. 2006;38:873-875  9. Wagner N , Flor Y, Eri J, Tan L, Yusef GM , Huyen ML, Gallinger S, Aj AG, Syngal S, Dhaval ML, Henrry J , Mil R, Nirmala SZ, Malachi JR, Shana VE, Miguel M, Vodev B, Leonidas N, Rosanne RH, Alexis KW, and Jaden AP. MONTEZ mutations in patients with hereditary pancreatic cancer. Cancer Discover. 2012;2:41-46  10. Christine PINEDA, et al. Breast-Cancer Risk in Families with Mutations in PALB2. NEJM. 2014; 371(6):497-506.  11. CHEK2 Breast Cancer Case-Control Consortium. CHEK2*1100delC and susceptibility to breast cancer: A collaborative analysis involving 10,860 breast cancer cases and 9,065 controls from 10 studies. Am J Hum Yolanda, 74 (2004), pp. 1537-3914  12. Kody T, Chano S, Luis Carlos K, et al. Spectrum of Mutations in BRCA1, BRCA2, CHEK2, and TP53 in Families at High Risk of Breast Cancer. DAVID. 2006;295(12):1920-6352.   13. Michael C, Brannon D, Jennyfer A, et al. Risk of breast cancer in women with a CHEK2 mutation with and without a family history of breast cancer. J Clin Oncol. 2011;29:8431-8130.  14. Jason H, Chanell E, Abdiaziz SJ, et al. Contribution of germline mutations in the RAD51B, RAD51C, and RAD51D genes to ovarian cancer in the population. J Clin Oncol. 2015;33(26):7503-5357. Doi:10.1200/JCO.2015.61.2408.  15. Ayaan T, Stanley RUSHING, Xiomara P, et al. Mutations in BRIP1 confer high risk of ovarian cancer. Lisa Yolanda. 2011;43(11):6509-4174. doi:10.1038/ng.955.

## 2021-09-02 ENCOUNTER — TELEPHONE (OUTPATIENT)
Dept: ONCOLOGY | Facility: CLINIC | Age: 36
End: 2021-09-02
Payer: COMMERCIAL

## 2021-09-02 DIAGNOSIS — Z80.8 FAMILY HISTORY OF GLIOBLASTOMA: ICD-10-CM

## 2021-09-02 DIAGNOSIS — Z80.42 FAMILY HISTORY OF PROSTATE CANCER: ICD-10-CM

## 2021-09-02 DIAGNOSIS — Z80.3 FAMILY HISTORY OF MALIGNANT NEOPLASM OF BREAST: Primary | ICD-10-CM

## 2021-09-02 NOTE — TELEPHONE ENCOUNTER
I followed up with Milagro today regarding the status of genetic testing for the 47 gene Common Hereditary Cancer Panel.  She was previously seen by Yvonne Epps MS, [facility] for genetic counseling 7/13/2021, and a saliva kit was provided by mail for sample collection.  Milagro would prefer to proceed with a blood draw for this test.  As Yvonne is currently on maternity leave, I am following up on her behalf.      A new order will be placed for a blood draw, to be completed at the [facility], and sent to [facility] for the 47 gene hereditary cancer panel.  Results are expected in 2-3 weeks, and Milagro will be contacted to schedule a return telephone appointment with me to discuss the results and screening recommendations once completed.    Kylie Davis MS, [facility]  Licensed, Certified Genetic Counselor  [facility]  Phone: [phone]

## 2021-09-15 ENCOUNTER — OFFICE VISIT (OUTPATIENT)
Dept: FAMILY MEDICINE | Facility: CLINIC | Age: 36
End: 2021-09-15
Payer: COMMERCIAL

## 2021-09-15 ENCOUNTER — ANCILLARY PROCEDURE (OUTPATIENT)
Dept: GENERAL RADIOLOGY | Facility: CLINIC | Age: 36
End: 2021-09-15
Attending: FAMILY MEDICINE
Payer: COMMERCIAL

## 2021-09-15 VITALS
RESPIRATION RATE: 22 BRPM | BODY MASS INDEX: 31.89 KG/M2 | SYSTOLIC BLOOD PRESSURE: 124 MMHG | DIASTOLIC BLOOD PRESSURE: 68 MMHG | WEIGHT: 203.2 LBS | OXYGEN SATURATION: 97 % | HEART RATE: 92 BPM | HEIGHT: 67 IN | TEMPERATURE: 98 F

## 2021-09-15 DIAGNOSIS — G89.29 CHRONIC RIGHT SHOULDER PAIN: ICD-10-CM

## 2021-09-15 DIAGNOSIS — G89.29 CHRONIC RIGHT SHOULDER PAIN: Primary | ICD-10-CM

## 2021-09-15 DIAGNOSIS — M25.511 CHRONIC RIGHT SHOULDER PAIN: Primary | ICD-10-CM

## 2021-09-15 DIAGNOSIS — F90.0 ATTENTION DEFICIT HYPERACTIVITY DISORDER (ADHD), PREDOMINANTLY INATTENTIVE TYPE: ICD-10-CM

## 2021-09-15 DIAGNOSIS — M25.511 CHRONIC RIGHT SHOULDER PAIN: ICD-10-CM

## 2021-09-15 PROCEDURE — 73030 X-RAY EXAM OF SHOULDER: CPT | Mod: RT | Performed by: RADIOLOGY

## 2021-09-15 PROCEDURE — 99214 OFFICE O/P EST MOD 30 MIN: CPT | Performed by: FAMILY MEDICINE

## 2021-09-15 RX ORDER — METHYLPHENIDATE HYDROCHLORIDE 20 MG/1
20 CAPSULE, EXTENDED RELEASE ORAL DAILY
Qty: 30 CAPSULE | Refills: 0 | Status: SHIPPED | OUTPATIENT
Start: 2021-11-16 | End: 2021-12-16

## 2021-09-15 RX ORDER — METHYLPHENIDATE HYDROCHLORIDE 20 MG/1
20 CAPSULE, EXTENDED RELEASE ORAL DAILY
Qty: 30 CAPSULE | Refills: 0 | Status: SHIPPED | OUTPATIENT
Start: 2021-09-15 | End: 2021-10-15

## 2021-09-15 RX ORDER — METHYLPHENIDATE HYDROCHLORIDE 20 MG/1
20 CAPSULE, EXTENDED RELEASE ORAL DAILY
Qty: 30 CAPSULE | Refills: 0 | Status: SHIPPED | OUTPATIENT
Start: 2021-10-16 | End: 2021-11-15

## 2021-09-15 ASSESSMENT — PAIN SCALES - GENERAL: PAINLEVEL: MILD PAIN (2)

## 2021-09-15 ASSESSMENT — MIFFLIN-ST. JEOR: SCORE: 1664.42

## 2021-09-15 NOTE — PROGRESS NOTES
Assessment & Plan     Chronic right shoulder pain  - No findings on exam or XR  - Refer to sports med for further eval   - XR Shoulder Right G/E 3 Views; Future  - Orthopedic  Referral; Future    Attention deficit hyperactivity disorder (ADHD), predominantly inattentive type  - Previously thought to have insomnia from Ritalin, but sleep has not improved since discontinuing medication and current Strattera isn't effective so will switch back to Ritalin   - methylphenidate (RITALIN LA) 20 MG 24 hr capsule; Take 20 mg by mouth daily  - methylphenidate (RITALIN LA) 20 MG 24 hr capsule; Take 20 mg by mouth daily  - methylphenidate (RITALIN LA) 20 MG 24 hr capsule; Take 20 mg by mouth daily      Return in about 3 months (around 12/15/2021) for ADHD.    Jazmin Rojas [facility]    =======================================================================    Subjective   Milagro is a 35 year old who presents for the following health issues:    HPI     ADHD Follow Up    Taking Medication as prescribed: yes    Side Effects:  None    Medication Helping Symptoms:  NO-should like to go back on methylphenidate as she was out of med for 1 week and did not notice any difference.  Still having insomnia so she doesn't think the stimulants were causing it.  Previously tried Adderall because it was cheaper, but it didn't work well and caused anorexia.  Ritalin has worked the best for her and she'd like to restart it.         Musculoskeletal problem/pain  Onset/Duration: 2008, worse over the past 3 weeks  Description  Location: shoulder and clavical - right  Joint Swelling: unsure  Redness: no  Pain: YES  Warmth: no  Intensity:  Depends, 2/10  Progression of Symptoms:  intermittent  Accompanying signs and symptoms:   Fevers: no  Numbness/tingling/weakness: no  History  Trauma to the area: YES- thinks she injured while sleeping and she has a habit of sleeping in weird positions  Recent  "illness:  no  Previous similar problem: has had it for years  Previous evaluation:  YES- in [id number] she did see a provider but nothing was done  Precipitating or alleviating factors:  Aggravating factors include: sitting at desk for a long time she noticed her shoulder would pop  Therapies tried and outcome: support wrap and acetaminophen, stretches     Previously didn't have radiation, but yesterday did note radiation down her arm to her pinky.  No numbness and tingling.  Sometimes feels like her shoulder is going to dislocate.        Review of Systems   Constitutional, HEENT, cardiovascular, pulmonary, gi and gu systems are negative, except as otherwise noted.      Objective    /68 (BP Location: Right arm, Patient Position: Chair, Cuff Size: Adult Regular)   Pulse 92   Temp 98  F (36.7  C) (Oral)   Resp 22   Ht 1.71 m (5' 7.32\")   Wt 92.2 kg (203 lb 3.2 oz)   SpO2 97%   BMI 31.52 kg/m    Body mass index is 31.52 kg/m .  Physical Exam   GENERAL: healthy, alert and no distress  MS: Right shoulder with normal ROM.  No TTP.  No obvious deformities.  Cross arm, Hawkin, and empty can are negative     XR Shoulder Right G/E 3 Views    Result Date: 9/15/2021  XR SHOULDER RIGHT G/E 3 VIEWS 9/15/2021 12:58 PM HISTORY: Chronic right shoulder pain, no prevous imaging; Chronic right shoulder pain; Chronic right shoulder pain     IMPRESSION: Negative exam. SOILA OG MD   SYSTEM ID:  [id number]            "

## 2021-09-20 ENCOUNTER — LAB (OUTPATIENT)
Dept: LAB | Facility: CLINIC | Age: 36
End: 2021-09-20
Attending: GENETIC COUNSELOR, MS
Payer: COMMERCIAL

## 2021-09-20 DIAGNOSIS — Z80.8 FAMILY HISTORY OF GLIOBLASTOMA: ICD-10-CM

## 2021-09-20 DIAGNOSIS — Z80.3 FAMILY HISTORY OF MALIGNANT NEOPLASM OF BREAST: ICD-10-CM

## 2021-09-20 DIAGNOSIS — Z80.42 FAMILY HISTORY OF PROSTATE CANCER: ICD-10-CM

## 2021-09-20 PROCEDURE — 36415 COLL VENOUS BLD VENIPUNCTURE: CPT | Performed by: PATHOLOGY

## 2021-09-21 NOTE — PROGRESS NOTES
"SUBJECTIVE:  Milagro Ferrera is a 35 year old female who is seen in consultation at the request of Dr Jazmin Rojas for right shoulder pain x years.  On and off episodes every 3-4 months that lasts a week.  Cause: unknown  Worsened pain about 3-4 weeks, ago, loss of motion.  So thiis is abnormal.  Chronically gets clicking in shoulder, but has worsened  Thinks from maybe sitting in same position.  Some neck pain with this.    Present symptoms: no weakness  Pain under clavicle, ?midshaft area, with clicking, and anterior shoulder.  Feels like it's going to \"dislocate\"    Associated symptoms:   ? Episode of radiating pain down arm to hand, no numbness/tingling that lasted 2 days and went away.    Treatment up to this point: stretches,shoulder brace.  \"no findings on exam\", so was referred to ortho.  nsaids and mr's which helped.       Past Medical History:   Diagnosis Date     Coronary artery disease 2010    usually caused from anxiety     Depressive disorder 2004    seen by 3 different psychs, no conclusive diagnosis       Past Surgical History:   Procedure Laterality Date     COSMETIC SURGERY  late 90s    mole removal: groin area and neck       REVIEW OF SYSTEMS:  CONSTITUTIONAL:  NEGATIVE for fever, chills, change in weight  INTEGUMENTARY/SKIN:  NEGATIVE for worrisome rashes, moles or lesions  EYES:  NEGATIVE for vision changes or irritation  ENT/MOUTH:  NEGATIVE for ear, mouth and throat problems  RESP:  NEGATIVE for significant cough or SOB  BREAST:  NEGATIVE for masses, tenderness or discharge  CV:  NEGATIVE for chest pain, palpitations or peripheral edema  GI:  NEGATIVE for nausea, abdominal pain, heartburn, or change in bowel habits  :  Negative   MUSCULOSKELETAL:  See HPI above  NEURO:  NEGATIVE for weakness, dizziness or paresthesias  ENDOCRINE:  NEGATIVE for temperature intolerance, skin/hair changes  HEME/ALLERGY/IMMUNE:  NEGATIVE for bleeding problems  PSYCHIATRIC:  NEGATIVE for changes in mood or " affect    OBJECTIVE:  /74   Pulse 104   Wt 93.4 kg (206 lb)   SpO2 97%   BMI 31.96 kg/m       GENERAL: healthy, alert and no distress  GAIT: normal   SKIN: no suspicious lesions or rashes  NEURO: Normal strength and tone, mentation intact and speech normal  VASCULAR:  normal pulses and cappillary refill   PSYCH:  mentation appears normal and affect normal/bright    MUSCULOSKELETAL:    NECK:  Cervical range of motion: full,  painfree, and does not cause shoulder pain or reproduce shoulder pain., causes pain in neck  Sensory deficits:  none  Motor deficits:  None    SHOULDER:  Shoulder Inspection: no swelling, bruising, discoloration, or obvious deformity or asymmetry  no atrophy  Tender: proximal bicep tendon and greater tuberosity  Non-tender: AC joint  Range of Motion:   Active:all normal   Passive: all normal, there is some clicking around the ? Biceps area  Impingement: Ramirez: 0, Neer: 0 and AER: 0  Strength: forward flexion 5/5, External rotation 5/5  Liftoff: Able Bear Hug: Negative    Special tests: Sulcus: 0  Noble's: Positive  Speed: Negative  Anterior Drawer: 0  Posterior Drawer: 0  O'Tyrone (SLAP/biceps):  Negative  Adsons normal     X-RAY INTERPRETATION  Reviewed with patient showed: XR SHOULDER RIGHT G/E 3 VIEWS 9/15/2021 12:58 PM   Negative exam.  Type 2 acromion    MRI: None    ASSESSMENT    ICD-10-CM    1. Chronic right shoulder pain  M25.511     G89.29    possible subluxing biceps, possible SLAP tear    PLAN:   referral to physical therapy  I reassured her that I didn't think she has significant shoulder instability or rotator cuff compromise.  Follow up 2 months if no improvement.  MRI-A maybe warranted at that point.      JADEN Amaya MD  Dept. Orthopedic Surgery  [facility]

## 2021-09-22 ENCOUNTER — OFFICE VISIT (OUTPATIENT)
Dept: ORTHOPEDICS | Facility: CLINIC | Age: 36
End: 2021-09-22
Attending: FAMILY MEDICINE
Payer: COMMERCIAL

## 2021-09-22 VITALS
SYSTOLIC BLOOD PRESSURE: 106 MMHG | OXYGEN SATURATION: 97 % | WEIGHT: 206 LBS | BODY MASS INDEX: 31.96 KG/M2 | HEART RATE: 104 BPM | DIASTOLIC BLOOD PRESSURE: 74 MMHG

## 2021-09-22 DIAGNOSIS — G89.29 CHRONIC RIGHT SHOULDER PAIN: Primary | ICD-10-CM

## 2021-09-22 DIAGNOSIS — S43.431A TEAR OF RIGHT GLENOID LABRUM, INITIAL ENCOUNTER: ICD-10-CM

## 2021-09-22 DIAGNOSIS — M25.511 CHRONIC RIGHT SHOULDER PAIN: Primary | ICD-10-CM

## 2021-09-22 PROCEDURE — 99244 OFF/OP CNSLTJ NEW/EST MOD 40: CPT | Performed by: ORTHOPAEDIC SURGERY

## 2021-09-22 ASSESSMENT — PAIN SCALES - GENERAL: PAINLEVEL: MODERATE PAIN (5)

## 2021-09-29 ENCOUNTER — THERAPY VISIT (OUTPATIENT)
Dept: PHYSICAL THERAPY | Facility: CLINIC | Age: 36
End: 2021-09-29
Attending: ORTHOPAEDIC SURGERY
Payer: COMMERCIAL

## 2021-09-29 DIAGNOSIS — S43.431A TEAR OF RIGHT GLENOID LABRUM, INITIAL ENCOUNTER: ICD-10-CM

## 2021-09-29 DIAGNOSIS — G89.29 CHRONIC RIGHT SHOULDER PAIN: Primary | ICD-10-CM

## 2021-09-29 DIAGNOSIS — M25.511 CHRONIC RIGHT SHOULDER PAIN: Primary | ICD-10-CM

## 2021-09-29 PROCEDURE — 97110 THERAPEUTIC EXERCISES: CPT | Mod: GP | Performed by: PHYSICAL THERAPIST

## 2021-09-29 PROCEDURE — 97112 NEUROMUSCULAR REEDUCATION: CPT | Mod: GP | Performed by: PHYSICAL THERAPIST

## 2021-09-29 PROCEDURE — 97161 PT EVAL LOW COMPLEX 20 MIN: CPT | Mod: GP | Performed by: PHYSICAL THERAPIST

## 2021-09-29 NOTE — PROGRESS NOTES
[facility] Initial Evaluation -- Upper Extremity    Evaluation Date: September 29, 2021  Milagro Ferrera is a 35 year old female with a R shld condition.   Referral: Sports Med  Work mechanical stresses: Office work - Computer, Sitting, Repetitive tasks  Employment status:  Normal hours except missed 1 day d/t pain  Leisure mechanical stresses: Not a regular exer  Functional disability score (SPADI): See Flow sheet  VAS score (0-10): 1/10 at rest.  Ranges 1-9/10  Handedness (R/L):  R  Patient goals/expectations:  shld exer for strengthening to avoid pain w/ daily activities    HISTORY    Present symptoms: Ant shld and clavicular area.  1 episode for 1 1/2 days of pain radiating down arm to little finger (tingling burning pain)  Pain quality (sharp/shooting/stabbing/aching/burning/cramping):  Sharp stabbing    Present since (onset date):  Current episode since late August.  MD referral 9-    Symptoms (improving/unchanging/worsening):  Improving ???.    Symptoms commenced as a result of: unknown   Condition occurred in the following environment: unknown    Symptoms at onset: same  Paresthesia (yes/no):  none  Spinal history: none   Cough/Sneeze (pos/neg):  neg    Constant symptoms: ant R shld  Intermittent symptoms: R arm - 1 episode    Symptoms are worse with the following: Sometimes Sitting, Sometimes Reaching overhead, out to side, , Sometimes Sleeping (prone/sup/side R/L) - feels pain in various positions and Time of day - Always AM and Always as the day progresses ( pain by end of work day 3-7/10)  Symptoms are better with the following: Other - stretching, OTC analgesics    Continued use makes the pain (better/worse/no effect): NE    Disturbed night (yes/no):  No - occas pain w/ finding position to sleep in    Pain at rest (yes/no): sometimes  Site (neck/shoulder/elbow/wrist/hand): and shld    Other questions (swelling/catching/clicking/locking/subluxing):  Clicking,  subluxing??    Previous episodes: pain on and off since 2008 - Insidious onset  Previous treatments: None    Specific Questions:  General health (excellent/good/fair/poor):  good  Pertinent medical history includes: Depression, Mental illness, Migraines/Headaches, Overweight, Sleep disorder/apnea and Smoking  Medications (nil/NSAIDS/analg/steroids/anticoag/other):  NSAIDS, Muscle relaxants and Other - Anti-depressants and Methylphinidate  Medical allergies:  NKA  Imaging (None/Xray/MRI/Other): X-ray - see Epic chart  Recent or major surgery (yes/no): Oral surgery/ mole removal  Night pain (yes/no): no  Accidents (yes/no): no  Unexplained weight loss (yes/no): no  Barriers at home: none  Other red flags: none    Sites for physical examination (neck/shoulder/elbow/wrist/hand): R shld    EXAMINATION    Posture:  Sitting (good/fair/poor): fair-poor  Correction of posture (better/worse/no effect/NA): NE  Standing (good/fair/poor): good  Other observations:      Neurological (NA/motor/sensory/reflexes/dural): NA    Baselines (pain or functional activity): Decr Passive Ext R shld w/ pain, Decr Triceps strength    Extremities (Shoulder/Elbow/Wrist/Hand): R shld    Movement Loss Live Mod Min Nil Pain   Flexion    X    Extension    X    Abduction    X    Internal Rotation    X    External Rotation    X       Passive Movement (+/- overpressure)/(PDM/ERP):     W/ OP - ERP Flex and ER (elbow at side)       Ext min decr w/ pain ant shld  Resisted Test Response (pain):    R shld Flex, Abd, ER, IR, and Biceps 5/5       Triceps 4/5  Other Tests:     Spine:  Movement Loss: Cerv AROM:  WNL except Ext slight decr  Effect of repeated movements:    Seated Cerv Retr x10 - NE, NE, Slight incr triceps   Seated Cerv Retr/ext x10 - Prod lower neck and UB, NW, Incr Passive ext w/ less pain and Incr triceps  Effect of static positioning:   Spine testing (not relevant/relevant/secondary problem): relevant    Baseline Symptoms: Will Clear Cerv  Spine then reassess R shld  Repeated Tests Symptom Response Mechanical Response   Active/Passive movement, resisted test, functional test During - Produce, Abolish, Increase, Decrease, NE After -   Better, Worse, NB, NW, NE Effect -   ? or ? ROM, strength or key functional test No Effect          Effect of static positioning                  Provisional Classification (Extremity/Spine): Spine - Derangement - Asymmetrical, unilateral, symptoms above elbow      Principle of Management:  Education:  Posture - Neutral Spine, use of L-roll, affect of posture on spine/pain, home office ergonomics, specificity of exer, centralisation/peripheralisation, spine as a source of extremity pain and HEP    Equipment provided:  None - Recommended using rolled towel for L-Roll whenever sitting.    Exercise and dosage:  Seated Cerv retr x10 6 x/day for 2-3 days then progress to Seated Cerv Retr/Ext x10 6 x/day    ASSESSMENT/PLAN:    Patient is a 35 year old female with right side shoulder complaints.    Patient has the following significant findings with corresponding treatment plan.                Diagnosis 1:  Chronic R Shld Pain  Pain -  hot/cold therapy, manual therapy, self management, education, directional preference exercise and home program  Decreased ROM/flexibility - manual therapy, therapeutic exercise and home program  Decreased strength - therapeutic exercise, therapeutic activities and home program  Decreased function - therapeutic activities and home program  Impaired posture - neuro re-education and home program    Therapy Evaluation Codes:     1) Clinical presentation characteristics are:   Stable/Uncomplicated.  2) Decision-Making    Low complexity using standardized patient assessment instrument and/or measureable assessment of functional outcome.  Cumulative Therapy Evaluation is: Low complexity.    Previous and current functional limitations:  (See Goal Flow Sheet for this information)    Short term and Long  term goals: (See Goal Flow Sheet for this information)     Communication ability:  Patient appears to be able to clearly communicate and understand verbal and written communication and follow directions correctly.  Treatment Explanation - The following has been discussed with the patient:   RX ordered/plan of care  Anticipated outcomes  Possible risks and side effects  This patient would benefit from PT intervention to resume normal activities.   Rehab potential is good.    Frequency:  1 X week, once daily  Duration:  for 8 weeks  Discharge Plan:  Achieve all LTG.  Independent in home treatment program.  Reach maximal therapeutic benefit.      Please refer to the daily flowsheet for treatment today, total treatment time and time spent performing 1:1 timed codes.

## 2021-10-04 LAB — SCANNED LAB RESULT: NORMAL

## 2021-10-05 ENCOUNTER — TELEPHONE (OUTPATIENT)
Dept: ONCOLOGY | Facility: CLINIC | Age: 36
End: 2021-10-05

## 2021-10-05 NOTE — TELEPHONE ENCOUNTER
10/5/21 - [facility] to call [phone] opt5, opt2 to schedule return virtual visit with Kylie Davis ANYTIME for genetic testing results. (Yvonne Epps is on maternity leave) -[place]

## 2021-10-06 ENCOUNTER — THERAPY VISIT (OUTPATIENT)
Dept: PHYSICAL THERAPY | Facility: CLINIC | Age: 36
End: 2021-10-06
Payer: COMMERCIAL

## 2021-10-06 DIAGNOSIS — M25.511 CHRONIC RIGHT SHOULDER PAIN: ICD-10-CM

## 2021-10-06 DIAGNOSIS — S43.431A TEAR OF RIGHT GLENOID LABRUM, INITIAL ENCOUNTER: ICD-10-CM

## 2021-10-06 DIAGNOSIS — G89.29 CHRONIC RIGHT SHOULDER PAIN: ICD-10-CM

## 2021-10-06 PROCEDURE — 97110 THERAPEUTIC EXERCISES: CPT | Mod: GP | Performed by: PHYSICAL THERAPY ASSISTANT

## 2021-10-06 PROCEDURE — 97112 NEUROMUSCULAR REEDUCATION: CPT | Mod: GP | Performed by: PHYSICAL THERAPY ASSISTANT

## 2021-10-18 ENCOUNTER — VIRTUAL VISIT (OUTPATIENT)
Dept: ONCOLOGY | Facility: CLINIC | Age: 36
End: 2021-10-18
Attending: GENETIC COUNSELOR, MS
Payer: COMMERCIAL

## 2021-10-18 DIAGNOSIS — Z80.3 FAMILY HISTORY OF MALIGNANT NEOPLASM OF BREAST: Primary | ICD-10-CM

## 2021-10-18 DIAGNOSIS — Z80.42 FAMILY HISTORY OF PROSTATE CANCER: ICD-10-CM

## 2021-10-18 DIAGNOSIS — Z80.8 FAMILY HISTORY OF GLIOBLASTOMA: ICD-10-CM

## 2021-10-18 PROCEDURE — 999N000069 HC STATISTIC GENETIC COUNSELING, < 16 MIN: Mod: 95 | Performed by: GENETIC COUNSELOR, MS

## 2021-10-18 NOTE — LETTER
"    10/18/2021         RE: Milagro Ferrera  [address] [place] [address] [address]  [facility] [id number]        Dear Colleague,    Thank you for referring your patient, Milagro Ferrera, to the [facility]. Please see a copy of my visit note below.    10/18/2021    Referring Provider: Jazmin Rojas DO    Presenting Information:  I spoke to Milagro today (virtual visit) to discuss her genetic testing results. She was previously seen for genetic counseling 7/13/2021 with Yvonne Epps [place] [facility] and [place] for the Common Hereditary Cancers Panel was sent through [facility]. Results from this genetic testing were discussed today.       Genetic Testing Result: NEGATIVE  Milagro is negative for mutations in the [facility] Common Cancers panel. No mutations were found in the 47 genes included in this test: APC, MONTEZ, AXIN2, BARD1, BMPR1A, BRCA1, BRCA2, BRIP1, CDH1, CDK4, CDKN2A, CHEK2, CTNNA1, DICER1, EPCAM, GREM1, HOXB13, KIT, MEN1, MLH1, MSH2, MSH3, MSH6, MUTYH, NBN, NF1, NTHL1, PALB2, PDGFRA, PMS2, POLD1, POLE, PTEN,RAD50, RAD51C, RAD51D, SDHA, SDHB, SDHC, SDHD, SMAD4, SMARCA4, STK11, TP53,TSC1, TSC2, VHL    A copy of the test report can be found in the Laboratory tab, dated 9/20/2021, and named \"LABORATORY MISCELLANEOUS ORDER\". The report is scanned in as a linked document.    Interpretation:  We discussed several different interpretations of this negative test result.    1. One explanation may be that there is a different gene or combination of genes and environment that are associated with the cancers in this family.  2. It is possible that a cancer susceptibility gene may be present in Milagro's family that may have contributed to the cancers in her maternal and/or paternal family, which she did not inherit.  Genetic counseling is recommended for her close relatives to address the family history of cancer and possible genetic testing options.    3. There is also a small possibility " that there is a mutation in one of these genes, and the testing laboratory could not find it with their current testing methods.       Screening:  Based on this negative test result, it is important for Milagro and her relatives to refer back to the family history for appropriate cancer screening.      Milagro likely remains at increased risk for breast cancer given her family history. Breast cancer screening is generally recommended to begin approximately 10 years younger than the earliest age of breast cancer diagnosis in the family, or at age 40, whichever comes first. We discussed that should her aunts have been diagnosed with breast cancer prior to age 50, earlier screening would be recommended for Milagro.  In addition, increased screening (such as annual mammogram and annual breast MRI, alternating every 6 months) may be considered.   Breast screening options should be discussed with an individual's primary care provider and a genetic counselor, to determine at what age to begin screening, what screening is appropriate, and if additional screening (such as breast MRI) is necessary based on personal/family history factors.  Milagro felt comfortable with this plan, and is encouraged to contact me should there be any changes in her family history.       Other population cancer screening options, such as those recommended by the American Cancer Society and the National Comprehensive Cancer Network (NCCN), are also appropriate for Milagro and her family. These screening recommendations may change if there are changes to Milagro's personal and/or family history of cancer. Final screening recommendations should be made by each individual's primary care provider.    Inheritance:  We reviewed the autosomal dominant inheritance of these 47 genes tested.      Summary:  We do not have an explanation for Milagro's family history of cancer. While no genetic changes were identified, Milagro may still be at risk for  certain cancers due to family history, environmental factors, or other genetic causes not identified by this test.  Because of that, it is important that she continue with cancer screening based on her personal and family history as discussed above.    Genetic testing is rapidly advancing, and new cancer susceptibility genes will most likely be identified in the future.  Therefore, I encouraged Milagro to contact me annually or if there are changes in her personal or family history.  This may change how we assess her cancer risk, screening, and the testing we would offer.    Plan:  1. Genetic test results and cancer screening recommendations were discussed.  2. She plans to follow-up with her managing physicians.  3. She should contact me regularly, or sooner if her family history changes.    If Milagro has any further questions, I encouraged her to contact me at [phone].    Time spent face to face (video visit): 5 minutes.    Kylie Davis MS, [facility]  Licensed, Certified Genetic Counselor  [facility]                  Again, thank you for allowing me to participate in the care of your patient.        Sincerely,        Kylie Davis, SHANA

## 2021-10-18 NOTE — PROGRESS NOTES
"10/18/2021    Referring Provider: Jazmin Rojas DO    Presenting Information:  I spoke to Mialgro today (virtual visit) to discuss her genetic testing results. She was previously seen for genetic counseling 7/13/2021 with [place]vonne Epps [place] [facility] [place] and testing for the Common Hereditary Cancers Panel was sent through [facility]. Results from this genetic testing were discussed today.       Genetic Testing Result: NEGATIVE  Milagro is negative for mutations in the [facility] Common Cancers panel. No mutations were found in the 47 genes included in this test: APC, MONTEZ, AXIN2, BARD1, BMPR1A, BRCA1, BRCA2, BRIP1, CDH1, CDK4, CDKN2A, CHEK2, CTNNA1, DICER1, EPCAM, GREM1, HOXB13, KIT, MEN1, MLH1, MSH2, MSH3, MSH6, MUTYH, NBN, NF1, NTHL1, PALB2, PDGFRA, PMS2, POLD1, POLE, PTEN,RAD50, RAD51C, RAD51D, SDHA, SDHB, SDHC, SDHD, SMAD4, SMARCA4, STK11, TP53,TSC1, TSC2, VHL    A copy of the test report can be found in the Laboratory tab, dated 9/20/2021, and named \"LABORATORY MISCELLANEOUS ORDER\". The report is scanned in as a linked document.    Interpretation:  We discussed several different interpretations of this negative test result.    1. One explanation may be that there is a different gene or combination of genes and environment that are associated with the cancers in this family.  2. It is possible that a cancer susceptibility gene may be present in Milagro's family that may have contributed to the cancers in her maternal and/or paternal family, which she did not inherit.  Genetic counseling is recommended for her close relatives to address the family history of cancer and possible genetic testing options.    3. There is also a small possibility that there is a mutation in one of these genes, and the testing laboratory could not find it with their current testing methods.       Screening:  Based on this negative test result, it is important for Milagro and her relatives to refer back to the family history for " appropriate cancer screening.      Milagro likely remains at increased risk for breast cancer given her family history. Breast cancer screening is generally recommended to begin approximately 10 years younger than the earliest age of breast cancer diagnosis in the family, or at age 40, whichever comes first. We discussed that should her aunts have been diagnosed with breast cancer prior to age 50, earlier screening would be recommended for Milagro.  In addition, increased screening (such as annual mammogram and annual breast MRI, alternating every 6 months) may be considered.   Breast screening options should be discussed with an individual's primary care provider and a genetic counselor, to determine at what age to begin screening, what screening is appropriate, and if additional screening (such as breast MRI) is necessary based on personal/family history factors.  Milagro felt comfortable with this plan, and is encouraged to contact me should there be any changes in her family history.       Other population cancer screening options, such as those recommended by the American Cancer Society and the National Comprehensive Cancer Network (NCCN), are also appropriate for Milagro and her family. These screening recommendations may change if there are changes to Milagro's personal and/or family history of cancer. Final screening recommendations should be made by each individual's primary care provider.    Inheritance:  We reviewed the autosomal dominant inheritance of these 47 genes tested.      Summary:  We do not have an explanation for Milagro's family history of cancer. While no genetic changes were identified, Milagro may still be at risk for certain cancers due to family history, environmental factors, or other genetic causes not identified by this test.  Because of that, it is important that she continue with cancer screening based on her personal and family history as discussed above.    Genetic testing  is rapidly advancing, and new cancer susceptibility genes will most likely be identified in the future.  Therefore, I encouraged Milagro to contact me annually or if there are changes in her personal or family history.  This may change how we assess her cancer risk, screening, and the testing we would offer.    Plan:  1. Genetic test results and cancer screening recommendations were discussed.  2. She plans to follow-up with her managing physicians.  3. She should contact me regularly, or sooner if her family history changes.    If Milagro has any further questions, I encouraged her to contact me at [phone].    Time spent face to face (video visit): 5 minutes.    Kylie Davis MS, [facility] [place]  Licensed, Certified Genetic Counselor  [facility]

## 2021-10-18 NOTE — LETTER
Cancer Risk Management  Program [facility]  [facility]  [facility]  [facility]  [facility]  Mailing Address  Cancer Risk Management Program  [address]  [place] [address]    New patient appointments  [phone]  October 18, 2021    Milagro Ferrera  [address] [place] [address] [address]   [facility] [id number]      Dear Milagro,    It was a pleasure speaking with you on 10/18/2021. Here is a copy of the progress note from our discussion. If you have any additional questions, please feel free to call.    Referring Provider: Jazmin Rojas DO    Presenting Information:  I spoke to Milagro today (virtual visit) to discuss her genetic testing results. She was previously seen for genetic counseling 7/13/2021 with Yvonne Epps MS, [facility] [place] and testing for the Common Hereditary Cancers Panel was sent through [facility]. Results from this genetic testing were discussed today.       Genetic Testing Result: NEGATIVE  Milagro is negative for mutations in the [facility] Common Cancers panel. No mutations were found in the 47 genes included in this test: APC, MONTEZ, AXIN2, BARD1, BMPR1A, BRCA1, BRCA2, BRIP1, CDH1, CDK4, CDKN2A, CHEK2, CTNNA1, DICER1, EPCAM, GREM1, HOXB13, KIT, MEN1, MLH1, MSH2, MSH3, MSH6, MUTYH, NBN, NF1, NTHL1, PALB2, PDGFRA, PMS2, POLD1, POLE, PTEN,RAD50, RAD51C, RAD51D, SDHA, SDHB, SDHC, SDHD, SMAD4, SMARCA4, STK11, TP53,TSC1, TSC2, VHL    Interpretation:  We discussed several different interpretations of this negative test result.    1. One explanation may be that there is a different gene or combination of genes and environment that are associated with the cancers in this family.  2. It is possible that a cancer susceptibility gene may be present in Milagro's family that may have contributed to the cancers in her  maternal and/or paternal family, which she did not inherit.  Genetic counseling is recommended for her close relatives to address the family history of cancer and possible genetic testing options.    3. There is also a small possibility that there is a mutation in one of these genes, and the testing laboratory could not find it with their current testing methods.       Screening:  Based on this negative test result, it is important for Milagro and her relatives to refer back to the family history for appropriate cancer screening.      Milagro likely remains at increased risk for breast cancer given her family history. Breast cancer screening is generally recommended to begin approximately 10 years younger than the earliest age of breast cancer diagnosis in the family, or at age 40, whichever comes first. We discussed that should her aunts have been diagnosed with breast cancer prior to age 50, earlier screening would be recommended for Milagro.  In addition, increased screening (such as annual mammogram and annual breast MRI, alternating every 6 months) may be considered.   Breast screening options should be discussed with an individual's primary care provider and a genetic counselor, to determine at what age to begin screening, what screening is appropriate, and if additional screening (such as breast MRI) is necessary based on personal/family history factors.  Milagro felt comfortable with this plan, and is encouraged to contact me should there be any changes in her family history.       Other population cancer screening options, such as those recommended by the American Cancer Society and the National Comprehensive Cancer Network (NCCN), are also appropriate for Milagro and her family. These screening recommendations may change if there are changes to Milagro's personal and/or family history of cancer. Final screening recommendations should be made by each individual's primary care  provider.    Inheritance:  We reviewed the autosomal dominant inheritance of these 47 genes tested.      Summary:  We do not have an explanation for Milagro's family history of cancer. While no genetic changes were identified, Milagro may still be at risk for certain cancers due to family history, environmental factors, or other genetic causes not identified by this test.  Because of that, it is important that she continue with cancer screening based on her personal and family history as discussed above.    Genetic testing is rapidly advancing, and new cancer susceptibility genes will most likely be identified in the future.  Therefore, I encouraged Milagro to contact me annually or if there are changes in her personal or family history.  This may change how we assess her cancer risk, screening, and the testing we would offer.    Plan:  1. Genetic test results and cancer screening recommendations were discussed.  2. She plans to follow-up with her managing physicians.  3. She should contact me regularly, or sooner if her family history changes.    If Milagro has any further questions, I encouraged her to contact me at [phone].    Kylie Davis MS, [facility]  Licensed, Certified Genetic Counselor  [facility]

## 2021-10-20 ENCOUNTER — MYC MEDICAL ADVICE (OUTPATIENT)
Dept: ORTHOPEDICS | Facility: CLINIC | Age: 36
End: 2021-10-20

## 2021-10-20 DIAGNOSIS — G89.29 CHRONIC RIGHT SHOULDER PAIN: Primary | ICD-10-CM

## 2021-10-20 DIAGNOSIS — S43.431A TEAR OF RIGHT GLENOID LABRUM, INITIAL ENCOUNTER: ICD-10-CM

## 2021-10-20 DIAGNOSIS — M25.511 CHRONIC RIGHT SHOULDER PAIN: Primary | ICD-10-CM

## 2021-10-20 NOTE — TELEPHONE ENCOUNTER
Discussed with Milagro with Dr Amaya 9/22/21 plan suggesting:PLAN:   referral to physical therapy  I reassured her that I didn't think she has significant shoulder instability or rotator cuff compromise.  Follow up 2 months if no improvement.  MRI-A maybe warranted at that point.     P: MR-A RIGHT shoulder ordered/ patient given instructions and phone # to contact.  She will wait for Dr Amaya to call with treatment plan based on results. Andi Norwood OPA

## 2021-10-23 DIAGNOSIS — Z11.59 ENCOUNTER FOR SCREENING FOR OTHER VIRAL DISEASES: ICD-10-CM

## 2021-10-24 ENCOUNTER — HEALTH MAINTENANCE LETTER (OUTPATIENT)
Age: 36
End: 2021-10-24

## 2021-11-08 ENCOUNTER — LAB (OUTPATIENT)
Dept: LAB | Facility: CLINIC | Age: 36
End: 2021-11-08
Payer: COMMERCIAL

## 2021-11-08 DIAGNOSIS — Z11.59 ENCOUNTER FOR SCREENING FOR OTHER VIRAL DISEASES: ICD-10-CM

## 2021-11-08 PROCEDURE — U0003 INFECTIOUS AGENT DETECTION BY NUCLEIC ACID (DNA OR RNA); SEVERE ACUTE RESPIRATORY SYNDROME CORONAVIRUS 2 (SARS-COV-2) (CORONAVIRUS DISEASE [COVID-19]), AMPLIFIED PROBE TECHNIQUE, MAKING USE OF HIGH THROUGHPUT TECHNOLOGIES AS DESCRIBED BY CMS-2020-01-R: HCPCS | Mod: 90 | Performed by: PATHOLOGY

## 2021-11-08 PROCEDURE — U0005 INFEC AGEN DETEC AMPLI PROBE: HCPCS | Mod: 90 | Performed by: PATHOLOGY

## 2021-11-09 LAB — SARS-COV-2 RNA RESP QL NAA+PROBE: NEGATIVE

## 2021-11-11 ENCOUNTER — ANCILLARY PROCEDURE (OUTPATIENT)
Dept: MRI IMAGING | Facility: CLINIC | Age: 36
End: 2021-11-11
Attending: ORTHOPAEDIC SURGERY
Payer: COMMERCIAL

## 2021-11-11 ENCOUNTER — ANCILLARY PROCEDURE (OUTPATIENT)
Dept: GENERAL RADIOLOGY | Facility: CLINIC | Age: 36
End: 2021-11-11
Attending: ORTHOPAEDIC SURGERY
Payer: COMMERCIAL

## 2021-11-11 DIAGNOSIS — M25.511 CHRONIC RIGHT SHOULDER PAIN: ICD-10-CM

## 2021-11-11 DIAGNOSIS — G89.29 CHRONIC RIGHT SHOULDER PAIN: ICD-10-CM

## 2021-11-11 DIAGNOSIS — S43.431A TEAR OF RIGHT GLENOID LABRUM, INITIAL ENCOUNTER: ICD-10-CM

## 2021-11-11 PROCEDURE — 73222 MRI JOINT UPR EXTREM W/DYE: CPT | Mod: RT | Performed by: RADIOLOGY

## 2021-11-11 PROCEDURE — 23350 INJECTION FOR SHOULDER X-RAY: CPT | Mod: RT | Performed by: RADIOLOGY

## 2021-11-11 PROCEDURE — A9585 GADOBUTROL INJECTION: HCPCS | Performed by: RADIOLOGY

## 2021-11-11 PROCEDURE — 73040 CONTRAST X-RAY OF SHOULDER: CPT | Mod: GC | Performed by: RADIOLOGY

## 2021-11-11 RX ORDER — LIDOCAINE HYDROCHLORIDE 10 MG/ML
30 INJECTION, SOLUTION EPIDURAL; INFILTRATION; INTRACAUDAL; PERINEURAL ONCE
Status: COMPLETED | OUTPATIENT
Start: 2021-11-11 | End: 2021-11-11

## 2021-11-11 RX ORDER — IOPAMIDOL 408 MG/ML
10 INJECTION, SOLUTION INTRATHECAL ONCE
Status: COMPLETED | OUTPATIENT
Start: 2021-11-11 | End: 2021-11-11

## 2021-11-11 RX ORDER — GADOBUTROL 604.72 MG/ML
7.5 INJECTION INTRAVENOUS ONCE
Status: COMPLETED | OUTPATIENT
Start: 2021-11-11 | End: 2021-11-11

## 2021-11-11 RX ADMIN — IOPAMIDOL 7 ML: 408 INJECTION, SOLUTION INTRATHECAL at 09:31

## 2021-11-11 RX ADMIN — GADOBUTROL 0.1 ML: 604.72 INJECTION INTRAVENOUS at 09:31

## 2021-11-11 RX ADMIN — LIDOCAINE HYDROCHLORIDE 7 ML: 10 INJECTION, SOLUTION EPIDURAL; INFILTRATION; INTRACAUDAL; PERINEURAL at 09:31

## 2021-11-11 NOTE — DISCHARGE INSTRUCTIONS
MRI Contrast Discharge Instructions    The IV contrast you received today will pass out of your body in your  urine. This will happen in the next 24 hours. You will not feel this process.  Your urine will not change color.    Drink at least 4 extra glasses of water or juice today (unless your doctor  has restricted your fluids). This reduces the stress on your kidneys.  You may take your regular medicines.    If you are on dialysis: It is best to have dialysis today.    If you have a reaction: Most reactions happen right away. If you have  any new symptoms after leaving the hospital (such as hives or swelling),  call your hospital at the correct number below. Or call your family doctor.  If you have breathing distress or wheezing, call 911.    Special instructions: ***    I have read and understand the above information.    Signature:______________________________________ Date:___________    Staff:__________________________________________ Date:___________     Time:__________    [facility] Departments:    ___[place]: [phone]  ___[facility]: [phone]  ___[place]: [phone] ___[facility]: [phone]  ___[facility]: [phone]  ___[facility]: [phone]  ___[phone]  ___[facility]: [phone]  ___[phone]

## 2021-11-16 NOTE — PROGRESS NOTES
"SUBJECTIVE:  Milagro Ferrera returns for MRI results from 11/11/21 of the right shoulder, which are reviewed with the patient by myself and showed:  1. Tendinosis of the supraspinatus with a a small focal intrasubstance  tear of the anterior fibers. Tendinosis of the infraspinatus.      2. Labral tear from approximately the 11:00 to 1:00 position.  No cyst.     3. Long biceps tendon is normally situated in the bicipital groove. No  partial or full-thickness tear.    Symptoms:  right shoulder pain x years.  On and off episodes every 3-4 months that lasts a week.  Cause: unknown  Worsened pain about 3-4 weeks, ago, loss of motion.  So thiis is abnormal.  Chronically gets clicking in shoulder, but has worsened  Thinks from maybe sitting in same position.  Some neck pain with this.     Present symptoms: no weakness  Pain under clavicle, ?midshaft area, with clicking, and anterior shoulder.  Feels like it's going to \"dislocate\"    Reminds me that she has had on/off pains x years, more constant the past 3 months.    Review of Systems:  Constitutional/General: Negative for fever, chills, change in weight  Integumentary/Skin: Negative for worrisome rashes, moles, or lesions  Neuro: Negative for weakness, dizziness, or paresthesias   Psychiatric: negative for changes in mood or affect    OBJECTIVE:  Physical Exam:  /64 (BP Location: Left arm, Patient Position: Sitting, Cuff Size: Adult Regular)   Pulse 100   Ht 1.71 m (5' 7.32\")   Wt 93.4 kg (206 lb)   SpO2 96%   BMI 31.96 kg/m    General Appearance: healthy, alert and no distress   Skin: no suspicious lesions or rashes  Neuro: Normal strength and tone, mentation intact and speech normal  Vascular: good pulses, and cappillary refill   Lymph: no lymphadenopathy   Psych:  mentation appears normal and affect normal/bright  Resp: no increased work of breathing      ASSESSMENT:   Encounter Diagnoses   Name Primary?     Superior glenoid labrum lesion of right shoulder, " subsequent encounter Yes     Tear of right glenoid labrum, subsequent encounter         PLAN:  We discussed SLAP repair vs diagnostic-therapeutic corticosteroid injection in the glenohumeral joint first before doing surgery.     This problem has been chronic, so I am not optimistic that a simple SLAP repair will solve everything.  Biceps tenodesis is another option, but she is a bit young to resort to that. .     Image guided corticosteroid injection glenohumeral joint ordered.    Return to clinic: as needed     Total time spent was 30 minutes; including but not limited to prep time and discussion.      JADEN Amaya MD  Dept. Orthopedic Surgery  [facility]

## 2021-11-17 ENCOUNTER — OFFICE VISIT (OUTPATIENT)
Dept: ORTHOPEDICS | Facility: CLINIC | Age: 36
End: 2021-11-17
Payer: COMMERCIAL

## 2021-11-17 VITALS
DIASTOLIC BLOOD PRESSURE: 64 MMHG | OXYGEN SATURATION: 96 % | WEIGHT: 206 LBS | SYSTOLIC BLOOD PRESSURE: 121 MMHG | BODY MASS INDEX: 32.33 KG/M2 | HEART RATE: 100 BPM | HEIGHT: 67 IN

## 2021-11-17 DIAGNOSIS — S43.431D TEAR OF RIGHT GLENOID LABRUM, SUBSEQUENT ENCOUNTER: ICD-10-CM

## 2021-11-17 DIAGNOSIS — S43.431D SUPERIOR GLENOID LABRUM LESION OF RIGHT SHOULDER, SUBSEQUENT ENCOUNTER: Primary | ICD-10-CM

## 2021-11-17 PROCEDURE — 99214 OFFICE O/P EST MOD 30 MIN: CPT | Performed by: ORTHOPAEDIC SURGERY

## 2021-11-17 ASSESSMENT — PAIN SCALES - GENERAL: PAINLEVEL: NO PAIN (1)

## 2021-11-17 ASSESSMENT — MIFFLIN-ST. JEOR: SCORE: 1677.12

## 2021-11-17 NOTE — LETTER
"    11/17/2021         RE: Milagro Ferrera  [address] [place] [address] [address]  [facility] [address]        Dear Colleague,    Thank you for referring your patient, Milagro Ferrera, to the [facility]. Please see a copy of my visit note below.    SUBJECTIVE:  Milagro Ferrera returns for MRI results from 11/11/21 of the right shoulder, which are reviewed with the patient by myself and showed:  1. Tendinosis of the supraspinatus with a a small focal intrasubstance  tear of the anterior fibers. Tendinosis of the infraspinatus.      2. Labral tear from approximately the 11:00 to 1:00 position.  No cyst.     3. Long biceps tendon is normally situated in the bicipital groove. No  partial or full-thickness tear.    Symptoms:  right shoulder pain x years.  On and off episodes every 3-4 months that lasts a week.  Cause: unknown  Worsened pain about 3-4 weeks, ago, loss of motion.  So thiis is abnormal.  Chronically gets clicking in shoulder, but has worsened  Thinks from maybe sitting in same position.  Some neck pain with this.     Present symptoms: no weakness  Pain under clavicle, ?midshaft area, with clicking, and anterior shoulder.  Feels like it's going to \"dislocate\"    Reminds me that she has had on/off pains x years, more constant the past 3 months.    Review of Systems:  Constitutional/General: Negative for fever, chills, change in weight  Integumentary/Skin: Negative for worrisome rashes, moles, or lesions  Neuro: Negative for weakness, dizziness, or paresthesias   Psychiatric: negative for changes in mood or affect    OBJECTIVE:  Physical Exam:  /64 (BP Location: Left arm, Patient Position: Sitting, Cuff Size: Adult Regular)   Pulse 100   Ht 1.71 m (5' 7.32\")   Wt 93.4 kg (206 lb)   SpO2 96%   BMI 31.96 kg/m    General Appearance: healthy, alert and no distress   Skin: no suspicious lesions or rashes  Neuro: Normal strength and tone, mentation intact and speech normal  Vascular: " good pulses, and cappillary refill   Lymph: no lymphadenopathy   Psych:  mentation appears normal and affect normal/bright  Resp: no increased work of breathing      ASSESSMENT:   Encounter Diagnoses   Name Primary?     Superior glenoid labrum lesion of right shoulder, subsequent encounter Yes     Tear of right glenoid labrum, subsequent encounter         PLAN:  We discussed SLAP repair vs diagnostic-therapeutic corticosteroid injection in the glenohumeral joint first before doing surgery.     This problem has been chronic, so I am not optimistic that a simple SLAP repair will solve everything.  Biceps tenodesis is another option, but she is a bit young to resort to that. .     Image guided corticosteroid injection glenohumeral joint ordered.    Return to clinic: as needed     Total time spent was 30 minutes; including but not limited to prep time and discussion.      JADEN Amaya MD  Dept. Orthopedic Surgery  [facility]          Again, thank you for allowing me to participate in the care of your patient.        Sincerely,        Meng Amaya MD

## 2021-12-04 ENCOUNTER — OFFICE VISIT (OUTPATIENT)
Dept: ORTHOPEDICS | Facility: CLINIC | Age: 36
End: 2021-12-04
Attending: ORTHOPAEDIC SURGERY
Payer: COMMERCIAL

## 2021-12-04 DIAGNOSIS — S43.431D TEAR OF RIGHT GLENOID LABRUM, SUBSEQUENT ENCOUNTER: ICD-10-CM

## 2021-12-04 DIAGNOSIS — S43.431D SUPERIOR GLENOID LABRUM LESION OF RIGHT SHOULDER, SUBSEQUENT ENCOUNTER: ICD-10-CM

## 2021-12-04 PROCEDURE — 20611 DRAIN/INJ JOINT/BURSA W/US: CPT | Mod: RT | Performed by: FAMILY MEDICINE

## 2021-12-04 RX ORDER — BETAMETHASONE SODIUM PHOSPHATE AND BETAMETHASONE ACETATE 3; 3 MG/ML; MG/ML
6 INJECTION, SUSPENSION INTRA-ARTICULAR; INTRALESIONAL; INTRAMUSCULAR; SOFT TISSUE
Status: SHIPPED | OUTPATIENT
Start: 2021-12-04

## 2021-12-04 RX ORDER — ROPIVACAINE HYDROCHLORIDE 5 MG/ML
3 INJECTION, SOLUTION EPIDURAL; INFILTRATION; PERINEURAL
Status: SHIPPED | OUTPATIENT
Start: 2021-12-04

## 2021-12-04 RX ADMIN — ROPIVACAINE HYDROCHLORIDE 3 ML: 5 INJECTION, SOLUTION EPIDURAL; INFILTRATION; PERINEURAL at 08:06

## 2021-12-04 RX ADMIN — BETAMETHASONE SODIUM PHOSPHATE AND BETAMETHASONE ACETATE 6 MG: 3; 3 INJECTION, SUSPENSION INTRA-ARTICULAR; INTRALESIONAL; INTRAMUSCULAR; SOFT TISSUE at 08:06

## 2021-12-04 NOTE — PROGRESS NOTES
Large Joint Injection/Arthocentesis: R glenohumeral joint    Date/Time: 12/4/2021 8:06 AM  Performed by: Madan Meraz MD  Authorized by: Madan Meraz MD     Indications:  Pain  Needle Size:  25 G  Guidance: ultrasound    Approach:  Posterolateral  Location:  Shoulder      Site:  R glenohumeral joint  Medications:  6 mg betamethasone acet & sod phos 6 (3-3) MG/ML; 3 mL ropivacaine 5 MG/ML  Medications comment:  Actual amount of ropivacaine used 4 mL  Outcome:  Tolerated well, no immediate complications  Procedure discussed: discussed risks, benefits, and alternatives    Consent Given by:  Patient  Timeout: timeout called immediately prior to procedure    Prep: patient was prepped and draped in usual sterile fashion     Referred by Dr. Amaya     Patient reported significant improvement of pain after the numbing portion right glenohumeral steroid injection.  Aftercare instructions given to patient.  Plan to follow-up as previously discussed with referring provider.     Madan Meraz MD [facility] [place]

## 2021-12-04 NOTE — PATIENT INSTRUCTIONS
[facility] Injection Discharge Instructions    Procedure: Right Shoulder Steroid Injection       You may shower, however avoid swimming, tub baths or hot tubs for 24 hours following your procedure    You may have a mild to moderate increase in pain for several days following the injection.    It may take up to 14 days for the steroid medication to start working although you may feel the effect as early as a few days after the procedure.    You may use ice packs for 10-15 minutes, 3 to 4 times a day at the injection site for comfort    You may use anti-inflammatory medications (such as Ibuprofen or Aleve or Advil) or Tylenol for pain control if necessary    If you were fasting, you may resume your normal diet and medications after the procedure    If you experience any of the following, call [facility] @ [phone] or [phone]  -Fever over 100 degree F  -Swelling, bleeding, redness, drainage, warmth at the injection site  - New or worsening pain    It was great seeing you today!    Madan Meraz

## 2021-12-04 NOTE — LETTER
12/4/2021         RE: Milagro Ferrera  [address] [place] [address] [address]  [facility] [address]        Dear Colleague,    Thank you for referring your patient, Milagro Ferrera, to the [facility]. Please see a copy of my visit note below.    Large Joint Injection/Arthocentesis: R glenohumeral joint    Date/Time: 12/4/2021 8:06 AM  Performed by: Madan Meraz MD  Authorized by: Madan Meraz MD     Indications:  Pain  Needle Size:  25 G  Guidance: ultrasound    Approach:  Posterolateral  Location:  Shoulder      Site:  R glenohumeral joint  Medications:  6 mg betamethasone acet & sod phos 6 (3-3) MG/ML; 3 mL ropivacaine 5 MG/ML  Medications comment:  Actual amount of ropivacaine used 4 mL  Outcome:  Tolerated well, no immediate complications  Procedure discussed: discussed risks, benefits, and alternatives    Consent Given by:  Patient  Timeout: timeout called immediately prior to procedure    Prep: patient was prepped and draped in usual sterile fashion     Referred by Dr. Amaya     Patient reported significant improvement of pain after the numbing portion right glenohumeral steroid injection.  Aftercare instructions given to patient.  Plan to follow-up as previously discussed with referring provider.     Madan Meraz MD [facility]              Again, thank you for allowing me to participate in the care of your patient.        Sincerely,        Madan Meraz MD

## 2022-01-03 ENCOUNTER — VIRTUAL VISIT (OUTPATIENT)
Dept: FAMILY MEDICINE | Facility: CLINIC | Age: 37
End: 2022-01-03
Payer: COMMERCIAL

## 2022-01-03 DIAGNOSIS — G89.29 CHRONIC RIGHT SHOULDER PAIN: ICD-10-CM

## 2022-01-03 DIAGNOSIS — F32.1 MODERATE MAJOR DEPRESSION (H): ICD-10-CM

## 2022-01-03 DIAGNOSIS — F90.0 ATTENTION DEFICIT HYPERACTIVITY DISORDER (ADHD), PREDOMINANTLY INATTENTIVE TYPE: Primary | ICD-10-CM

## 2022-01-03 DIAGNOSIS — M25.511 CHRONIC RIGHT SHOULDER PAIN: ICD-10-CM

## 2022-01-03 PROCEDURE — 99214 OFFICE O/P EST MOD 30 MIN: CPT | Mod: 95 | Performed by: FAMILY MEDICINE

## 2022-01-03 RX ORDER — METHYLPHENIDATE HYDROCHLORIDE 20 MG/1
20 CAPSULE, EXTENDED RELEASE ORAL DAILY
Qty: 30 CAPSULE | Refills: 0 | Status: SHIPPED | OUTPATIENT
Start: 2022-02-03 | End: 2022-03-05

## 2022-01-03 RX ORDER — METHYLPHENIDATE HYDROCHLORIDE 20 MG/1
20 CAPSULE, EXTENDED RELEASE ORAL DAILY
Qty: 30 CAPSULE | Refills: 0 | Status: SHIPPED | OUTPATIENT
Start: 2022-03-06 | End: 2022-04-05

## 2022-01-03 RX ORDER — METHYLPHENIDATE HYDROCHLORIDE 20 MG/1
20 CAPSULE, EXTENDED RELEASE ORAL DAILY
Qty: 30 CAPSULE | Refills: 0 | Status: SHIPPED | OUTPATIENT
Start: 2022-01-03 | End: 2022-02-02

## 2022-01-03 ASSESSMENT — PATIENT HEALTH QUESTIONNAIRE - PHQ9: SUM OF ALL RESPONSES TO PHQ QUESTIONS 1-9: 11

## 2022-01-03 NOTE — PROGRESS NOTES
Milagro is a 36 year old who is being evaluated via a billable video visit.      How would you like to obtain your AVS? MyChart  If the video visit is dropped, the invitation should be resent by: Text to cell phone: [phone]  Will anyone else be joining your video visit? No    =======================================================================================================    Assessment & Plan     Attention deficit hyperactivity disorder (ADHD), predominantly inattentive type  - Stable on current dose of Ritalin  - Sent in Rx for January, February, and March  - Call in April for 3 more refills   - methylphenidate (RITALIN LA) 20 MG 24 hr capsule; Take 20 mg by mouth daily  - methylphenidate (RITALIN LA) 20 MG 24 hr capsule; Take 20 mg by mouth daily  - methylphenidate (RITALIN LA) 20 MG 24 hr capsule; Take 20 mg by mouth daily    Moderate major depression (H)  - Stable on current dose of Zoloft.  No refills needed until June     Chronic right shoulder pain  - Advised her to follow up with sports medicine since it sounds like this is a nonsurgical issue      Return in about 6 months (around 7/3/2022) for Physical Exam, ADHD.    Jazmin Rojas, [facility]    =====================================================================================================    Subjective   Milagro is a 36 year old who presents for the following health issues:    HPI     Medication Followup of Ritalin    Taking Medication as prescribed: yes    Side Effects:  None    Medication Helping Symptoms:  yes     Previously thought to have some insomnia from stimulants, but did not improve after stimulants were discontinued.  Strattera was trialed, but was ineffective.  Restarted Ritalin in September.  She's feeling much improved since then.  Hasn't noticed any side effects.  However, has been having to skip some doses of her Ritalin since she hasn't had refills available recently.  Last fill was  11/18.      She's been seeing orthopedics regarding her right shoulder pain.  Had an MRI that showed a SLAP lesion and was treated with a steroid injection in early December.  Unfortunately, the steroid injection only lasted for a few days.  Her pain is now back.  She does note that the steroid injection helped with her ROM.  She's not sure where to go from here.  Ortho told her that surgery would likely not be helpful.        Review of Systems   Constitutional, HEENT, cardiovascular, pulmonary, gi and gu systems are negative, except as otherwise noted.      Objective           Vitals:  No vitals were obtained today due to virtual visit.    Physical Exam   GENERAL: Healthy, alert and no distress  EYES: Eyes grossly normal to inspection.  No discharge or erythema, or obvious scleral/conjunctival abnormalities.  RESP: No audible wheeze, cough, or visible cyanosis.  No visible retractions or increased work of breathing.    SKIN: Visible skin clear. No significant rash, abnormal pigmentation or lesions.  NEURO: Cranial nerves grossly intact.  Mentation and speech appropriate for age.  PSYCH: Mentation appears normal, affect normal/bright, judgement and insight intact, normal speech and appearance well-groomed.          Video-Visit Details    Type of service:  Video Visit     Video Start Time: 1:42 PM   Video End Time: 1:51 PM    Originating Location (pt. Location): Home    Distant Location (provider location):  [facility]     Platform used for Video Visit: Letsgofordinner

## 2022-03-20 ENCOUNTER — MYC REFILL (OUTPATIENT)
Dept: FAMILY MEDICINE | Facility: CLINIC | Age: 37
End: 2022-03-20
Payer: COMMERCIAL

## 2022-03-20 DIAGNOSIS — F90.0 ATTENTION DEFICIT HYPERACTIVITY DISORDER (ADHD), PREDOMINANTLY INATTENTIVE TYPE: ICD-10-CM

## 2022-03-20 RX ORDER — METHYLPHENIDATE HYDROCHLORIDE 20 MG/1
20 CAPSULE, EXTENDED RELEASE ORAL DAILY
Qty: 30 CAPSULE | Refills: 0 | Status: CANCELLED | OUTPATIENT
Start: 2022-03-20

## 2022-03-21 NOTE — TELEPHONE ENCOUNTER
Routing refill request to provider for review/approval because:  Drug not on the [facility] refill protocol     Toya Faye RN

## 2022-03-22 RX ORDER — METHYLPHENIDATE HYDROCHLORIDE 20 MG/1
20 CAPSULE, EXTENDED RELEASE ORAL DAILY
Qty: 30 CAPSULE | Refills: 0 | Status: SHIPPED | OUTPATIENT
Start: 2022-06-10 | End: 2022-03-22

## 2022-03-22 RX ORDER — METHYLPHENIDATE HYDROCHLORIDE 20 MG/1
20 CAPSULE, EXTENDED RELEASE ORAL DAILY
Qty: 30 CAPSULE | Refills: 0 | Status: SHIPPED | OUTPATIENT
Start: 2022-05-11 | End: 2022-07-27

## 2022-03-22 RX ORDER — METHYLPHENIDATE HYDROCHLORIDE 20 MG/1
20 CAPSULE, EXTENDED RELEASE ORAL DAILY
Qty: 30 CAPSULE | Refills: 0 | Status: SHIPPED | OUTPATIENT
Start: 2022-04-11 | End: 2022-03-22

## 2022-03-22 RX ORDER — METHYLPHENIDATE HYDROCHLORIDE 20 MG/1
20 CAPSULE, EXTENDED RELEASE ORAL DAILY
Qty: 30 CAPSULE | Refills: 0 | Status: SHIPPED | OUTPATIENT
Start: 2022-06-10 | End: 2022-07-27

## 2022-03-22 RX ORDER — METHYLPHENIDATE HYDROCHLORIDE 20 MG/1
20 CAPSULE, EXTENDED RELEASE ORAL DAILY
Qty: 30 CAPSULE | Refills: 0 | Status: SHIPPED | OUTPATIENT
Start: 2022-05-11 | End: 2022-03-22

## 2022-03-22 RX ORDER — METHYLPHENIDATE HYDROCHLORIDE 20 MG/1
20 CAPSULE, EXTENDED RELEASE ORAL DAILY
Qty: 30 CAPSULE | Refills: 0 | Status: SHIPPED | OUTPATIENT
Start: 2022-04-11 | End: 2022-07-27

## 2022-03-22 NOTE — TELEPHONE ENCOUNTER
Routing back to PCP- please send rx's. Transmission failed.    E-Prescribing Status: Transmission to pharmacy failed (3/22/2022 11:25 AM CDT)    Toya Faye RN

## 2022-05-05 ENCOUNTER — OFFICE VISIT (OUTPATIENT)
Dept: FAMILY MEDICINE | Facility: CLINIC | Age: 37
End: 2022-05-05
Payer: COMMERCIAL

## 2022-05-05 VITALS
HEART RATE: 90 BPM | WEIGHT: 232 LBS | TEMPERATURE: 98.3 F | SYSTOLIC BLOOD PRESSURE: 126 MMHG | RESPIRATION RATE: 22 BRPM | OXYGEN SATURATION: 97 % | DIASTOLIC BLOOD PRESSURE: 80 MMHG | HEIGHT: 67 IN | BODY MASS INDEX: 36.41 KG/M2

## 2022-05-05 DIAGNOSIS — Z72.0 TOBACCO ABUSE DISORDER: ICD-10-CM

## 2022-05-05 DIAGNOSIS — G89.29 CHRONIC RIGHT SHOULDER PAIN: Primary | ICD-10-CM

## 2022-05-05 DIAGNOSIS — M25.511 CHRONIC RIGHT SHOULDER PAIN: Primary | ICD-10-CM

## 2022-05-05 PROCEDURE — 99214 OFFICE O/P EST MOD 30 MIN: CPT | Performed by: FAMILY MEDICINE

## 2022-05-05 RX ORDER — NAPROXEN 250 MG/1
250 TABLET ORAL 2 TIMES DAILY WITH MEALS
COMMUNITY

## 2022-05-05 RX ORDER — BUPROPION HYDROCHLORIDE 150 MG/1
150 TABLET ORAL EVERY MORNING
Qty: 90 TABLET | Refills: 1 | Status: SHIPPED | OUTPATIENT
Start: 2022-05-05 | End: 2022-10-15

## 2022-05-05 ASSESSMENT — PAIN SCALES - GENERAL: PAINLEVEL: NO PAIN (0)

## 2022-05-05 NOTE — PROGRESS NOTES
Assessment & Plan     Chronic right shoulder pain  - Previously evaluated by orthopedic surgery and told surgery was likely not an option   - Had a glenohumeral steroid injection in December which was only helpful for 2-3 days  - Advised her to schedule a visit with sports med to discuss further injection options.    - Orthopedic  Referral; Future    Tobacco abuse disorder  - Start Wellbutrin for smoking cessation   - buPROPion (WELLBUTRIN XL) 150 MG 24 hr tablet; Take 1 tablet (150 mg) by mouth every morning    Tobacco Cessation:   reports that she has been smoking other. She started smoking about 15 years ago. She has a 0.10 pack-year smoking history. She uses smokeless tobacco.  Tobacco Cessation Action Plan: Pharmacotherapies : Zyban/Wellbutrin    Jazmin Rojas DO  [facility]    ==============================================================    Subjective   Milagro is a 36 year old who presents for the following health issues     Shoulder Pain    History of Present Illness       Reason for visit:  Shoulder pain and nicotine dependence consult  Symptom onset:  More than a month  Symptoms include:  Pain, shoulder locking, decreased rom  Symptom intensity:  Moderate  Symptom progression:  Staying the same  Had these symptoms before:  Yes  Has tried/received treatment for these symptoms:  Yes  Previous treatment was successful:  No  What makes it worse:  Prolonged sitting  What makes it better:  Massage and stretching    She eats 0-1 servings of fruits and vegetables daily.She consumes 0 sweetened beverage(s) daily. She exercises with enough effort to increase her heart rate 3 or less days per week. She is missing 1 dose(s) of medications per week.  She is not taking prescribed medications regularly due to remembering to take.     She's been seeing orthopedics regarding her right shoulder pain.  Had an MRI that showed a SLAP lesion and was treated with a steroid injection  "in early December.  Unfortunately, the steroid injection only lasted for a few days.  She has continued to have pain and clicking in her shoulder.  Often feels like it might dislocate.   She does note that the steroid injection helped with her ROM.  Ortho told her that surgery would likely not be helpful.  She did work with physical therapy, but didn't find it to be helpful.  Pain is managable with NSAIDs, but still present.      She has been smoking persistently (vape) for about 4-5 years.  Prior to that smoked socially.  Currently smoking       Review of Systems   Constitutional, HEENT, cardiovascular, pulmonary, gi and gu systems are negative, except as otherwise noted.      Objective    /80 (BP Location: Right arm, Patient Position: Chair, Cuff Size: Adult Large)   Pulse 90   Temp 98.3  F (36.8  C) (Oral)   Resp 22   Ht 1.71 m (5' 7.32\")   Wt 105.2 kg (232 lb)   SpO2 97%   BMI 35.99 kg/m    Body mass index is 35.99 kg/m .  Physical Exam   GENERAL: healthy, alert and no distress  MS: Mild TTP along right AC joint.  No deformities or swelling           "

## 2022-05-10 NOTE — PROGRESS NOTES
ASSESSMENT & PLAN    Milagro was seen today for follow up, pain and pain.    Diagnoses and all orders for this visit:    Cervical radiculopathy  -     tiZANidine (ZANAFLEX) 4 MG tablet; Take 1 tablet (4 mg) by mouth nightly as needed for muscle spasms    Chronic right shoulder pain  -     Orthopedic  Referral    Arm numbness  -     XR Cervical Spine 2/3 vws; Future      # Right Shoulder/Cervical Pain: Symptoms noted over the past 6+ months with pain over the anterior right shoulder as well as the right neck causing headaches.  She does have some mild symptoms with cervical neck compression on the right as well as tenderness to palpation over the anterior shoulder.  Reviewed previous shoulder MRI showing irritation of biceps tendon, cervical x-rays today showing straightening of normal cervical curvature.  I do think she has a combination of irritated biceps tendon as well as possible developing cervical radiculopathy.  Counseled patient on nature of condition and treatment options.  Given this plan as below, follow-up as needed  Image Findings: straightening of normal cervical curvature, reviewed right shoulder MRI  Treatment: Activities as tolerated, home exercises given today  Job: as tolerated  Medications/Injections: Limited tylenol/ibuprofen for pain for 1-2 weeks, refill of tizanidine given, defer steroid injections as needed  Follow-up: In one month if symptoms do not improve, sooner if worsening  Can consider cervical MRI vs repeat shoulder injection     -----    SUBJECTIVE:  Milagro Ferrera is a 36 year old female who is seen in follow-up for right sided shoulder pain. They were last seen 12/4/2021 for a corticosteroid injection. The patient is seen by themselves. Right glenohumeral corticosteroid injection performed on 12/4/2021 allowed for one week of moderate relief in pain. Notes some tingling down her right arm and more pain into the region lateral to her neck. Occurs about once a  "week.    Since their last visit reports pain with prolonged use of her right arm, sitting at a desk for long periods of time, any shoulder movements and sleeping on her side. Feels like her shoulder \"pops\" in and out. They indicate that their current pain level is 4/10. They have tried NSAIDS, PT before her MRI (migraines started after PT started working on her posture).  ROM improved and pain since steroid injection. Does note shoulder popping.  Pain still nagging.       Patient's past medical, surgical, social, and family histories were reviewed today and no changes are noted.    REVIEW OF SYSTEMS:  Constitutional: NEGATIVE for fever, chills, change in weight  Skin: NEGATIVE for worrisome rashes, moles or lesions  GI/: NEGATIVE for bowel or bladder changes  Neuro: NEGATIVE for weakness, dizziness or paresthesias    OBJECTIVE:  /64   Wt 104.2 kg (229 lb 12.8 oz)   BMI 35.65 kg/m     General: healthy, alert and in no distress  HEENT: no scleral icterus or conjunctival erythema  Skin: no suspicious lesions or rash. No jaundice.  CV: regular rhythm by palpation, no pedal edema  Resp: normal respiratory effort without conversational dyspnea   Psych: normal mood and affect  Gait: normal steady gait with appropriate coordination and balance  Neuro: normal light touch sensory exam of the extremities.    MSK:    RIGHT SHOULDER  Inspection:    no swelling, bruising, discoloration, or obvious deformity or asymmetry  Palpation:    Tender about the proximal biceps tendon. Remainder of bony and tendinous landmarks are nontender.  Active Range of Motion:     Abduction 1800, FF 1800, , IR L1.    Strength:    Scapular plane abduction 5/5,  ER 5/5, IR 5/5, biceps 5/5, triceps 5/5  Special Tests:    Positive: None    Negative: Neer's, Ramirez', supraspinatus (empty can), [place]'s, Speed's and Yergason's    CERVICAL SPINE  Inspection:    normal cervical lordosis present, rounded shoulders, forward head " posture  Palpation:    Nontender.  Range of Motion:     Flexion full    Extension full    Right side bend full    Left side bend full    Right rotation full    Left rotation full  Strength:    Full strength throughout all neck muscles  Special Tests:    Positive: None    Negative: Spurling's (bilateral)        Independent visualization of the below image:    EXAM: MR right shoulder arthrogram 11/11/2021 11:02 AM     TECHNIQUE: Multiplanar, multisequence imaging of the right shoulder  were obtained after administration of intra-articular gadolinium  contrast using routine protocol.     History: Shoulder pain, labral tear suspected, nondiagnostic x-ray;  Chronic right shoulder pain; Chronic right shoulder pain; Tear of  right glenoid labrum, initial encounter      Additional Clinical information from EMR: 35-year-old woman with  chronic right shoulder pain with clinical concern for biceps tendon  subluxation and/or SLAP tear.     Comparison: Same-day shoulder arthrogram, radiographs 9/15/2021     Findings:  Cervical x-rays     ROTATOR CUFF and ASSOCIATED STRUCTURES  Rotator cuff: Tendinosis of the supraspinatus with a focal  intrasubstance tear of the anterior fibers, which measures  approximately 3 mm in anterior posterior dimension, and extends  approximately 9 mm from the footprint, see sagittal image 4 and  coronal image 19. Tendinosis of the infraspinatus. Evaluation of the  subscapularis limited by contrast infiltration. The teres minor is  intact.     Bursa: No subacromial or subdeltoid bursal fluid.     Musculature: Muscle bulk of rotator cuff is preserved.  Deltoid muscle  bulk is also preserved.  No muscle edema.     Acromioclavicular joint  There are mild degenerative changes of the acromioclavicular joint.  Acromion is type 2 in sagittal morphology.  Coracoacromial ligament is  not thickened.     OSSEOUS STRUCTURES  No fracture, marrow contusion or marrow infiltration.     LONG BICIPITAL TENDON  The long  head of the biceps tendon is normally situated within the  bicipital groove. No complete or partial biceps tendon tear is  present.     GLENOHUMERAL JOINT  Joint fluid: Adequately distended with intra-articular contrast. There  is distention of the subscapular recess, and the subacromial bursa.     Cartilage and subarticular bone:  No focal hyaline cartilage defects  are noted. No Hill-Sachs, reverse Hill-Sachs, or bony Bankart lesions  are seen.     Labrum: Anatomic variation of the anterior superior and anterior  labrum consistent with sublabral foramen. There is tearing of the  labrum from approximately the 11 to 1:00 position, see sagittal image  21.     ANCILLARY FINDINGS:  None.                                                                      Impression:  1. Tendinosis of the supraspinatus with a a small focal intrasubstance  tear of the anterior fibers. Tendinosis of the infraspinatus.      2. Labral tear from approximately the 11:00 to 1:00 position.     3. Long biceps tendon is normally situated in the bicipital groove. No  partial or full-thickness tear.     I have personally reviewed the examination and initial interpretation  and I agree with the findings.     MD Madan LINDSEY MD (Joe), [facility]

## 2022-05-12 ENCOUNTER — OFFICE VISIT (OUTPATIENT)
Dept: ORTHOPEDICS | Facility: CLINIC | Age: 37
End: 2022-05-12
Attending: FAMILY MEDICINE

## 2022-05-12 ENCOUNTER — ANCILLARY PROCEDURE (OUTPATIENT)
Dept: GENERAL RADIOLOGY | Facility: CLINIC | Age: 37
End: 2022-05-12
Attending: FAMILY MEDICINE
Payer: COMMERCIAL

## 2022-05-12 VITALS — SYSTOLIC BLOOD PRESSURE: 118 MMHG | BODY MASS INDEX: 35.65 KG/M2 | DIASTOLIC BLOOD PRESSURE: 64 MMHG | WEIGHT: 229.8 LBS

## 2022-05-12 DIAGNOSIS — R20.0 ARM NUMBNESS: ICD-10-CM

## 2022-05-12 DIAGNOSIS — G89.29 CHRONIC RIGHT SHOULDER PAIN: ICD-10-CM

## 2022-05-12 DIAGNOSIS — M25.511 CHRONIC RIGHT SHOULDER PAIN: ICD-10-CM

## 2022-05-12 DIAGNOSIS — M54.12 CERVICAL RADICULOPATHY: Primary | ICD-10-CM

## 2022-05-12 PROCEDURE — 99214 OFFICE O/P EST MOD 30 MIN: CPT | Performed by: FAMILY MEDICINE

## 2022-05-12 PROCEDURE — 72040 X-RAY EXAM NECK SPINE 2-3 VW: CPT | Mod: TC | Performed by: RADIOLOGY

## 2022-05-12 ASSESSMENT — PAIN SCALES - GENERAL: PAINLEVEL: MODERATE PAIN (4)

## 2022-05-12 NOTE — PATIENT INSTRUCTIONS
# Right Shoulder/Cervical Pain: Symptoms noted over the past 6+ months with pain over the anterior right shoulder as well as the right neck causing headaches.  She does have some mild symptoms with cervical neck compression on the right as well as tenderness to palpation over the anterior shoulder.  Reviewed previous shoulder MRI showing irritation of biceps tendon, cervical x-rays today showing straightening of normal cervical curvature.  I do think she has a combination of irritated biceps tendon as well as possible developing cervical radiculopathy.  Counseled patient on nature of condition and treatment options.  Given this plan as below, follow-up as needed  Image Findings: straightening of normal cervical curvature, reviewed right shoulder MRI  Treatment: Activities as tolerated, home exercises given today  Job: as tolerated  Medications/Injections: Limited tylenol/ibuprofen for pain for 1-2 weeks, refill of tizanidine given, defer steroid injections as needed  Follow-up: In one month if symptoms do not improve, sooner if worsening  Can consider cervical MRI vs repeat shoulder injection     Please call [phone]   Ask for my team if you have any questions or concerns    If you have not yet received the influenza vaccine but would like to get one, please call  [phone] or you can schedule via [employer]    It was great seeing you again today!    Madan Meraz MD, [facility]

## 2022-05-12 NOTE — LETTER
5/12/2022         RE: Milagro Ferrera  [address] [place] [address] [address]  [facility] [address]        Dear Colleague,    Thank you for referring your patient, Milagro Ferrera, to the [facility]. Please see a copy of my visit note below.    ASSESSMENT & PLAN    Milagro was seen today for follow up, pain and pain.    Diagnoses and all orders for this visit:    Cervical radiculopathy  -     tiZANidine (ZANAFLEX) 4 MG tablet; Take 1 tablet (4 mg) by mouth nightly as needed for muscle spasms    Chronic right shoulder pain  -     Orthopedic  Referral    Arm numbness  -     XR Cervical Spine 2/3 vws; Future      # Right Shoulder/Cervical Pain: Symptoms noted over the past 6+ months with pain over the anterior right shoulder as well as the right neck causing headaches.  She does have some mild symptoms with cervical neck compression on the right as well as tenderness to palpation over the anterior shoulder.  Reviewed previous shoulder MRI showing irritation of biceps tendon, cervical x-rays today showing straightening of normal cervical curvature.  I do think she has a combination of irritated biceps tendon as well as possible developing cervical radiculopathy.  Counseled patient on nature of condition and treatment options.  Given this plan as below, follow-up as needed  Image Findings: straightening of normal cervical curvature, reviewed right shoulder MRI  Treatment: Activities as tolerated, home exercises given today  Job: as tolerated  Medications/Injections: Limited tylenol/ibuprofen for pain for 1-2 weeks, refill of tizanidine given, defer steroid injections as needed  Follow-up: In one month if symptoms do not improve, sooner if worsening  Can consider cervical MRI vs repeat shoulder injection     -----    SUBJECTIVE:  Milagro Ferrera is a 36 year old female who is seen in follow-up for right sided shoulder pain. They were last seen 12/4/2021 for a corticosteroid  "injection. The patient is seen by themselves. Right glenohumeral corticosteroid injection performed on 12/4/2021 allowed for one week of moderate relief in pain. Notes some tingling down her right arm and more pain into the region lateral to her neck. Occurs about once a week.    Since their last visit reports pain with prolonged use of her right arm, sitting at a desk for long periods of time, any shoulder movements and sleeping on her side. Feels like her shoulder \"pops\" in and out. They indicate that their current pain level is 4/10. They have tried NSAIDS, PT before her MRI (migraines started after PT started working on her posture).  ROM improved and pain since steroid injection. Does note shoulder popping.  Pain still nagging.       Patient's past medical, surgical, social, and family histories were reviewed today and no changes are noted.    REVIEW OF SYSTEMS:  Constitutional: NEGATIVE for fever, chills, change in weight  Skin: NEGATIVE for worrisome rashes, moles or lesions  GI/: NEGATIVE for bowel or bladder changes  Neuro: NEGATIVE for weakness, dizziness or paresthesias    OBJECTIVE:  /64   Wt 104.2 kg (229 lb 12.8 oz)   BMI 35.65 kg/m     General: healthy, alert and in no distress  HEENT: no scleral icterus or conjunctival erythema  Skin: no suspicious lesions or rash. No jaundice.  CV: regular rhythm by palpation, no pedal edema  Resp: normal respiratory effort without conversational dyspnea   Psych: normal mood and affect  Gait: normal steady gait with appropriate coordination and balance  Neuro: normal light touch sensory exam of the extremities.    MSK:    RIGHT SHOULDER  Inspection:    no swelling, bruising, discoloration, or obvious deformity or asymmetry  Palpation:    Tender about the proximal biceps tendon. Remainder of bony and tendinous landmarks are nontender.  Active Range of Motion:     Abduction 1800, FF 1800, , IR L1.    Strength:    Scapular plane abduction 5/5,  ER 5/5, " IR 5/5, biceps 5/5, triceps 5/5  Special Tests:    Positive: None    Negative: Neer's, Ramirez', supraspinatus (empty can), Tioga's, Speed's and Yergason's    CERVICAL SPINE  Inspection:    normal cervical lordosis present, rounded shoulders, forward head posture  Palpation:    Nontender.  Range of Motion:     Flexion full    Extension full    Right side bend full    Left side bend full    Right rotation full    Left rotation full  Strength:    Full strength throughout all neck muscles  Special Tests:    Positive: None    Negative: Spurling's (bilateral)        Independent visualization of the below image:    EXAM: MR right shoulder arthrogram 11/11/2021 11:02 AM     TECHNIQUE: Multiplanar, multisequence imaging of the right shoulder  were obtained after administration of intra-articular gadolinium  contrast using routine protocol.     History: Shoulder pain, labral tear suspected, nondiagnostic x-ray;  Chronic right shoulder pain; Chronic right shoulder pain; Tear of  right glenoid labrum, initial encounter      Additional Clinical information from EMR: 35-year-old woman with  chronic right shoulder pain with clinical concern for biceps tendon  subluxation and/or SLAP tear.     Comparison: Same-day shoulder arthrogram, radiographs 9/15/2021     Findings:  Cervical x-rays     ROTATOR CUFF and ASSOCIATED STRUCTURES  Rotator cuff: Tendinosis of the supraspinatus with a focal  intrasubstance tear of the anterior fibers, which measures  approximately 3 mm in anterior posterior dimension, and extends  approximately 9 mm from the footprint, see sagittal image 4 and  coronal image 19. Tendinosis of the infraspinatus. Evaluation of the  subscapularis limited by contrast infiltration. The teres minor is  intact.     Bursa: No subacromial or subdeltoid bursal fluid.     Musculature: Muscle bulk of rotator cuff is preserved.  Deltoid muscle  bulk is also preserved.  No muscle edema.     Acromioclavicular joint  There are  mild degenerative changes of the acromioclavicular joint.  Acromion is type 2 in sagittal morphology.  Coracoacromial ligament is  not thickened.     OSSEOUS STRUCTURES  No fracture, marrow contusion or marrow infiltration.     LONG BICIPITAL TENDON  The long head of the biceps tendon is normally situated within the  bicipital groove. No complete or partial biceps tendon tear is  present.     GLENOHUMERAL JOINT  Joint fluid: Adequately distended with intra-articular contrast. There  is distention of the subscapular recess, and the subacromial bursa.     Cartilage and subarticular bone:  No focal hyaline cartilage defects  are noted. No Hill-Sachs, reverse Hill-Sachs, or bony Bankart lesions  are seen.     Labrum: Anatomic variation of the anterior superior and anterior  labrum consistent with sublabral foramen. There is tearing of the  labrum from approximately the 11 to 1:00 position, see sagittal image  21.     ANCILLARY FINDINGS:  None.                                                                      Impression:  1. Tendinosis of the supraspinatus with a a small focal intrasubstance  tear of the anterior fibers. Tendinosis of the infraspinatus.      2. Labral tear from approximately the 11:00 to 1:00 position.     3. Long biceps tendon is normally situated in the bicipital groove. No  partial or full-thickness tear.     I have personally reviewed the examination and initial interpretation  and I agree with the findings.     MD Madan LINDSEY MD (Joe), [facility]        Again, thank you for allowing me to participate in the care of your patient.        Sincerely,        Madan Meraz MD

## 2022-06-20 ENCOUNTER — MYC MEDICAL ADVICE (OUTPATIENT)
Dept: FAMILY MEDICINE | Facility: CLINIC | Age: 37
End: 2022-06-20
Payer: COMMERCIAL

## 2022-06-29 ENCOUNTER — VIRTUAL VISIT (OUTPATIENT)
Dept: FAMILY MEDICINE | Facility: CLINIC | Age: 37
End: 2022-06-29
Payer: COMMERCIAL

## 2022-06-29 DIAGNOSIS — S43.431A TEAR OF RIGHT GLENOID LABRUM, INITIAL ENCOUNTER: ICD-10-CM

## 2022-06-29 DIAGNOSIS — F90.0 ATTENTION DEFICIT HYPERACTIVITY DISORDER (ADHD), PREDOMINANTLY INATTENTIVE TYPE: ICD-10-CM

## 2022-06-29 DIAGNOSIS — F32.1 MODERATE MAJOR DEPRESSION (H): Primary | ICD-10-CM

## 2022-06-29 PROCEDURE — 99214 OFFICE O/P EST MOD 30 MIN: CPT | Mod: 95 | Performed by: FAMILY MEDICINE

## 2022-06-29 RX ORDER — METHYLPHENIDATE HYDROCHLORIDE 20 MG/1
20 CAPSULE, EXTENDED RELEASE ORAL DAILY
Qty: 30 CAPSULE | Refills: 0 | Status: SHIPPED | OUTPATIENT
Start: 2022-07-15 | End: 2022-08-14

## 2022-06-29 RX ORDER — METHYLPHENIDATE HYDROCHLORIDE 20 MG/1
20 CAPSULE, EXTENDED RELEASE ORAL DAILY
Qty: 30 CAPSULE | Refills: 0 | Status: SHIPPED | OUTPATIENT
Start: 2022-08-15 | End: 2022-09-14

## 2022-06-29 RX ORDER — METHYLPHENIDATE HYDROCHLORIDE 20 MG/1
20 CAPSULE, EXTENDED RELEASE ORAL DAILY
Qty: 30 CAPSULE | Refills: 0 | Status: SHIPPED | OUTPATIENT
Start: 2022-09-15 | End: 2022-10-15

## 2022-06-29 ASSESSMENT — PATIENT HEALTH QUESTIONNAIRE - PHQ9
SUM OF ALL RESPONSES TO PHQ QUESTIONS 1-9: 11
10. IF YOU CHECKED OFF ANY PROBLEMS, HOW DIFFICULT HAVE THESE PROBLEMS MADE IT FOR YOU TO DO YOUR WORK, TAKE CARE OF THINGS AT HOME, OR GET ALONG WITH OTHER PEOPLE: SOMEWHAT DIFFICULT
SUM OF ALL RESPONSES TO PHQ QUESTIONS 1-9: 11

## 2022-06-29 NOTE — PROGRESS NOTES
Milagro is a 36 year old who is being evaluated via a billable video visit.      How would you like to obtain your AVS? MyChart  If the video visit is dropped, the invitation should be resent by: Text to cell phone: [phone]  Will anyone else be joining your video visit? No    ======================================================================    Assessment & Plan     Moderate major depression (H)  ADD (attention deficit hyperactivity disorder, inattentive type)  - methylphenidate (RITALIN LA) 20 MG 24 hr capsule; Take 20 mg by mouth daily for 30 days  - methylphenidate (RITALIN LA) 20 MG 24 hr capsule; Take 20 mg by mouth daily for 30 days  - methylphenidate (RITALIN LA) 20 MG 24 hr capsule; Take 20 mg by mouth daily for 30 days    Tear of right glenoid labrum, initial encounter    Patient needs FMLA/disability forms completed for previously missed work and for an upcoming leave of absence due to her worsening depression and right shoulder pain.  Completed her forms for her today.  Plan for up to 4 days off in a month for flares and will have a ALEX from 7/5-7/29.  She will follow up with me the last week of July to discuss further workability     Return in about 4 weeks (around 7/27/2022).    Jazmin Rojas, [facility]    ================================================================    Subjective   Milagro is a 36 year old, presenting for the following health issues:  Forms      History of Present Illness       Mental Health Follow-up:  Patient presents to follow-up on Depression.Patient's depression since last visit has been:  Worse  The patient is not having other symptoms associated with depression.      Any significant life events: job concerns (Lots of changes at work that is stressful)  Patient is not feeling anxious or having panic attacks.  Patient has no concerns about alcohol or drug use.    Reason for visit:  Discussion of leave and disability  paperwork    She eats 0-1 servings of fruits and vegetables daily.She consumes 0 sweetened beverage(s) daily.She exercises with enough effort to increase her heart rate 9 or less minutes per day.  She exercises with enough effort to increase her heart rate 3 or less days per week. She is missing 2 dose(s) of medications per week.  She is not taking prescribed medications regularly due to remembering to take.    Today's PHQ-9         PHQ-9 Total Score: 11    PHQ-9 Q9 Thoughts of better off dead/self-harm past 2 weeks :   Several days  Thoughts of suicide or self harm: (P) Yes  Self-harm Plan:   (P) No  Self-harm Action:     (P) No  Safety concerns for self or others: (P) No    How difficult have these problems made it for you to do your work, take care of things at home, or get along with other people: Somewhat difficult    Depression Followup  Patient has ADHD and MDD.  Currently on Ritalin LA 20 mg daily, Sertraline 100 mg daily, ad Wellbutrin 150 mg daily.  She has had a lot of job stress over the past 1-2 months.  She works as a  (computer work all day).  She doesn't have an ergonomic set up and it's been causing issues with right shoulder pain.  There have been a lot of work changes lately that have been very stressful.  Her depression is much worse.  She hasn't been sleeping well at all.  Has been needing to take muscle relaxers for her shoulder pain and is unable to work on them because they make her so tired.  She has started working with a new supervisor and that has helped, but she still doesn't feel mentally well.  She'd like to take some some time off to work on her mental health.      Social History     Tobacco Use     Smoking status: Current Every Day Smoker     Packs/day: 0.10     Years: 1.00     Pack years: 0.10     Types: Other     Start date: 2007     Last attempt to quit: 2017     Years since quittin.4     Smokeless tobacco: Current User     Tobacco comment: social smoker  smoked 1-2 cigarettes/month   Vaping Use     Vaping Use: Never used   Substance Use Topics     Alcohol use: Yes     Comment: rare     Drug use: No     PHQ 6/1/2021 1/3/2022 6/29/2022   PHQ-9 Total Score 8 11 11   Q9: Thoughts of better off dead/self-harm past 2 weeks Not at all Not at all Several days   F/U: Thoughts of suicide or self-harm - - Yes   F/U: Self harm-plan - - No   F/U: Self-harm action - - No   F/U: Safety concerns - - No     LISA-7 SCORE 2/8/2018 3/9/2020 12/30/2020   Total Score - - -   Total Score 0 3 0     Last PHQ-9 6/29/2022   1.  Little interest or pleasure in doing things 1   2.  Feeling down, depressed, or hopeless 1   3.  Trouble falling or staying asleep, or sleeping too much 2   4.  Feeling tired or having little energy 1   5.  Poor appetite or overeating 3   6.  Feeling bad about yourself 1   7.  Trouble concentrating 1   8.  Moving slowly or restless 0   Q9: Thoughts of better off dead/self-harm past 2 weeks 1   PHQ-9 Total Score 11   Difficulty at work, home, or with people -   In the past two weeks have you had thoughts of suicide or self harm? Yes   Do you have concerns about your personal safety or the safety of others? No   In the past 2 weeks have you thought about a plan or had intention to harm yourself? No   In the past 2 weeks have you acted on these thoughts in any way? No     LISA-7  12/30/2020   1. Feeling nervous, anxious, or on edge 0   2. Not being able to stop or control worrying 0   3. Worrying too much about different things 0   4. Trouble relaxing 0   5. Being so restless that it is hard to sit still 0   6. Becoming easily annoyed or irritable 0   7. Feeling afraid, as if something awful might happen 0   LISA-7 Total Score 0   If you checked any problems, how difficult have they made it for you to do your work, take care of things at home, or get along with other people? Not difficult at all       Review of Systems   Constitutional, HEENT, cardiovascular, pulmonary, gi  and gu systems are negative, except as otherwise noted.      Objective           Vitals:  No vitals were obtained today due to virtual visit.    Physical Exam   GENERAL: Healthy, alert and no distress  EYES: Eyes grossly normal to inspection.  No discharge or erythema, or obvious scleral/conjunctival abnormalities.  RESP: No audible wheeze, cough, or visible cyanosis.  No visible retractions or increased work of breathing.    SKIN: Visible skin clear. No significant rash, abnormal pigmentation or lesions.  NEURO: Cranial nerves grossly intact.  Mentation and speech appropriate for age.  PSYCH: Mentation appears normal, affect flat, judgement and insight intact, normal speech and appearance well-groomed.          Video-Visit Details    Video Start Time: 1:19 PM     Type of service:  Video Visit    Video End Time: 1:39 PM     Originating Location (pt. Location): Home    Distant Location (provider location):  [facility]     Platform used for Video Visit: [employer]    .  Danielle.

## 2022-06-30 ENCOUNTER — TELEPHONE (OUTPATIENT)
Dept: ORTHOPEDICS | Facility: CLINIC | Age: 37
End: 2022-06-30

## 2022-06-30 DIAGNOSIS — M54.12 CERVICAL RADICULOPATHY: ICD-10-CM

## 2022-06-30 NOTE — TELEPHONE ENCOUNTER
Refill request from [facility] for Tizanidine 4 mg tablets.    Per chart review- last OV 5/12/22.     Tena Kennedy, ATC

## 2022-07-25 ENCOUNTER — MYC MEDICAL ADVICE (OUTPATIENT)
Dept: FAMILY MEDICINE | Facility: CLINIC | Age: 37
End: 2022-07-25

## 2022-07-27 ENCOUNTER — VIRTUAL VISIT (OUTPATIENT)
Dept: FAMILY MEDICINE | Facility: CLINIC | Age: 37
End: 2022-07-27
Payer: COMMERCIAL

## 2022-07-27 DIAGNOSIS — F32.1 MODERATE MAJOR DEPRESSION (H): ICD-10-CM

## 2022-07-27 DIAGNOSIS — F90.0 ATTENTION DEFICIT HYPERACTIVITY DISORDER (ADHD), PREDOMINANTLY INATTENTIVE TYPE: Primary | ICD-10-CM

## 2022-07-27 PROCEDURE — 99213 OFFICE O/P EST LOW 20 MIN: CPT | Mod: 95 | Performed by: FAMILY MEDICINE

## 2022-07-27 ASSESSMENT — PATIENT HEALTH QUESTIONNAIRE - PHQ9: SUM OF ALL RESPONSES TO PHQ QUESTIONS 1-9: 3

## 2022-07-27 NOTE — LETTER
[facility]  [address] [address]  Phone: [phone]    July 27, 2022        Milagro Ferrera  [address]   [place] [facility] [address]          To whom it may concern:    RE: Milagro Ferrera    Patient was seen and treated today at our clinic.  Patient may return to work 8/1/22 with the following restrictions:    - Please allow her to have up to 4 days off per month as needed for flare ups    Please contact me for questions or concerns.      Sincerely,          Jazmin Rojas, DO

## 2022-07-27 NOTE — Clinical Note
I saved a letter in pts chart from her virtual visit today (7/27).  Can you please fax the letter to [phone] Attn: leave

## 2022-07-27 NOTE — PROGRESS NOTES
Milagro is a 36 year old who is being evaluated via a billable video visit.      How would you like to obtain your AVS? MyChart  If the video visit is dropped, the invitation should be resent by: Text to cell phone: [phone]  Will anyone else be joining your video visit? No    ===============================================================================    Assessment & Plan     ADD (attention deficit hyperactivity disorder, inattentive type)  Moderate major depression (H)  - Depression symptoms have improved significantly since our last visit and pt feels comfortable returning to work  - Will fax a letter to her work with RTW date of 8/1 and continued intermittent leave as needed (up to 4 days per month)      Return in about 6 months (around 1/27/2023) for Depression/Anxiety, ADHD.    Jazmin Rojas, [facility]    ===========================================================================      Subjective   Milagro is a 36 year old, presenting for the following health issues:  Depression and Work Disability      History of Present Illness       Reason for visit:  Discussion of return to work after medical leave    She eats 2-3 servings of fruits and vegetables daily.She consumes 0 sweetened beverage(s) daily.She exercises with enough effort to increase her heart rate 10 to 19 minutes per day.  She exercises with enough effort to increase her heart rate 3 or less days per week.   She is taking medications regularly.     Patient has a diagnosis of depression as well as ADD.  She had missed some work intermittently and also requested a ALEX recently due to her worsening depression as well as issues with her right shoulder.  Completed paperwork for her last month with plans for up to 3 days off in a month for flares and an ALEX from 7/5-7/29.  Currently taking Wellbutrin  mg daily, sertraline 100 mg daily, Ritalin LA 20 mg daily.  Since being on her ALEX she feels that her  mood have improved greatly.  Has been able to catch up on sleep, work on self care, and talk with friends who have similar symptoms.      Depression Followup    How are you doing with your depression since your last visit? Improved a little    Are you having other symptoms that might be associated with depression? No    Have you had a significant life event?  No     Are you feeling anxious or having panic attacks?   No    Do you have any concerns with your use of alcohol or other drugs? No    Social History     Tobacco Use     Smoking status: Current Every Day Smoker     Packs/day: 0.10     Years: 1.00     Pack years: 0.10     Types: Other     Start date: 2007     Last attempt to quit: 2017     Years since quittin.5     Smokeless tobacco: Current User     Tobacco comment: social smoker smoked 1-2 cigarettes/month   Vaping Use     Vaping Use: Never used   Substance Use Topics     Alcohol use: Yes     Comment: rare     Drug use: No     PHQ 1/3/2022 2022 2022   PHQ-9 Total Score 11 11 3   Q9: Thoughts of better off dead/self-harm past 2 weeks Not at all Several days Not at all   F/U: Thoughts of suicide or self-harm - Yes -   F/U: Self harm-plan - No -   F/U: Self-harm action - No -   F/U: Safety concerns - No -     LISA-7 SCORE 2018 3/9/2020 2020   Total Score - - -   Total Score 0 3 0     Last PHQ-9 2022   1.  Little interest or pleasure in doing things 0   2.  Feeling down, depressed, or hopeless 1   3.  Trouble falling or staying asleep, or sleeping too much 1   4.  Feeling tired or having little energy 0   5.  Poor appetite or overeating 0   6.  Feeling bad about yourself 0   7.  Trouble concentrating 1   8.  Moving slowly or restless 0   Q9: Thoughts of better off dead/self-harm past 2 weeks 0   PHQ-9 Total Score 3   Difficulty at work, home, or with people Somewhat difficult   In the past two weeks have you had thoughts of suicide or self harm? -   Do you have concerns about  your personal safety or the safety of others? -   In the past 2 weeks have you thought about a plan or had intention to harm yourself? -   In the past 2 weeks have you acted on these thoughts in any way? -     LISA-7  12/30/2020   1. Feeling nervous, anxious, or on edge 0   2. Not being able to stop or control worrying 0   3. Worrying too much about different things 0   4. Trouble relaxing 0   5. Being so restless that it is hard to sit still 0   6. Becoming easily annoyed or irritable 0   7. Feeling afraid, as if something awful might happen 0   LISA-7 Total Score 0   If you checked any problems, how difficult have they made it for you to do your work, take care of things at home, or get along with other people? Not difficult at all         Review of Systems   Constitutional, HEENT, cardiovascular, pulmonary, gi and gu systems are negative, except as otherwise noted.      Objective           Vitals:  No vitals were obtained today due to virtual visit.    Physical Exam   GENERAL: Healthy, alert and no distress  EYES: Eyes grossly normal to inspection.  No discharge or erythema, or obvious scleral/conjunctival abnormalities.  RESP: No audible wheeze, cough, or visible cyanosis.  No visible retractions or increased work of breathing.    SKIN: Visible skin clear. No significant rash, abnormal pigmentation or lesions.  NEURO: Cranial nerves grossly intact.  Mentation and speech appropriate for age.  PSYCH: Mentation appears normal, affect normal/bright, judgement and insight intact, normal speech and appearance well-groomed.            Video-Visit Details    Video Start Time: 1:06 PM     Type of service:  Video Visit    Video End Time: 1:14 PM     Originating Location (pt. Location): Home    Distant Location (provider location):  [facility]     Platform used for Video Visit: [employer]  ..

## 2022-07-31 ENCOUNTER — HEALTH MAINTENANCE LETTER (OUTPATIENT)
Age: 37
End: 2022-07-31

## 2022-10-14 DIAGNOSIS — Z72.0 TOBACCO ABUSE DISORDER: ICD-10-CM

## 2022-10-15 ENCOUNTER — HEALTH MAINTENANCE LETTER (OUTPATIENT)
Age: 37
End: 2022-10-15

## 2022-10-15 RX ORDER — BUPROPION HYDROCHLORIDE 150 MG/1
TABLET ORAL
Qty: 90 TABLET | Refills: 0 | Status: SHIPPED | OUTPATIENT
Start: 2022-10-15 | End: 2023-07-14

## 2022-10-24 ENCOUNTER — MYC REFILL (OUTPATIENT)
Dept: FAMILY MEDICINE | Facility: CLINIC | Age: 37
End: 2022-10-24

## 2022-10-24 DIAGNOSIS — F90.0 ATTENTION DEFICIT HYPERACTIVITY DISORDER (ADHD), PREDOMINANTLY INATTENTIVE TYPE: ICD-10-CM

## 2022-10-24 RX ORDER — METHYLPHENIDATE HYDROCHLORIDE 20 MG/1
20 CAPSULE, EXTENDED RELEASE ORAL DAILY
Qty: 30 CAPSULE | Refills: 0 | Status: CANCELLED | OUTPATIENT
Start: 2022-10-24

## 2022-10-26 RX ORDER — METHYLPHENIDATE HYDROCHLORIDE 20 MG/1
20 CAPSULE, EXTENDED RELEASE ORAL DAILY
Qty: 30 CAPSULE | Refills: 0 | Status: SHIPPED | OUTPATIENT
Start: 2022-10-26 | End: 2022-11-25

## 2022-10-26 RX ORDER — METHYLPHENIDATE HYDROCHLORIDE 20 MG/1
20 CAPSULE, EXTENDED RELEASE ORAL DAILY
Qty: 30 CAPSULE | Refills: 0 | Status: SHIPPED | OUTPATIENT
Start: 2022-11-26 | End: 2022-12-26

## 2022-10-26 RX ORDER — METHYLPHENIDATE HYDROCHLORIDE 20 MG/1
20 CAPSULE, EXTENDED RELEASE ORAL DAILY
Qty: 30 CAPSULE | Refills: 0 | Status: SHIPPED | OUTPATIENT
Start: 2022-12-27 | End: 2023-01-26

## 2023-07-14 ENCOUNTER — TELEPHONE (OUTPATIENT)
Dept: FAMILY MEDICINE | Facility: CLINIC | Age: 38
End: 2023-07-14
Payer: COMMERCIAL

## 2023-07-14 DIAGNOSIS — Z72.0 TOBACCO ABUSE DISORDER: ICD-10-CM

## 2023-07-14 RX ORDER — BUPROPION HYDROCHLORIDE 150 MG/1
150 TABLET ORAL EVERY MORNING
Qty: 90 TABLET | Refills: 0 | Status: SHIPPED | OUTPATIENT
Start: 2023-07-14 | End: 2023-08-23

## 2023-07-14 NOTE — TELEPHONE ENCOUNTER
Patient will need to establish care with a New PCP.  One refill has been provided so she is not without medication.  Would advise scheduling her yearly preventive visit

## 2023-08-20 ENCOUNTER — HEALTH MAINTENANCE LETTER (OUTPATIENT)
Age: 38
End: 2023-08-20

## 2023-08-23 ENCOUNTER — OFFICE VISIT (OUTPATIENT)
Dept: FAMILY MEDICINE | Facility: CLINIC | Age: 38
End: 2023-08-23
Payer: COMMERCIAL

## 2023-08-23 VITALS
HEIGHT: 67 IN | WEIGHT: 241 LBS | OXYGEN SATURATION: 97 % | BODY MASS INDEX: 37.83 KG/M2 | DIASTOLIC BLOOD PRESSURE: 80 MMHG | TEMPERATURE: 98.2 F | SYSTOLIC BLOOD PRESSURE: 124 MMHG | HEART RATE: 102 BPM

## 2023-08-23 DIAGNOSIS — E66.812 CLASS 2 OBESITY WITHOUT SERIOUS COMORBIDITY WITH BODY MASS INDEX (BMI) OF 37.0 TO 37.9 IN ADULT, UNSPECIFIED OBESITY TYPE: ICD-10-CM

## 2023-08-23 DIAGNOSIS — S43.431D TEAR OF RIGHT GLENOID LABRUM, SUBSEQUENT ENCOUNTER: ICD-10-CM

## 2023-08-23 DIAGNOSIS — Z00.00 ROUTINE GENERAL MEDICAL EXAMINATION AT A HEALTH CARE FACILITY: Primary | ICD-10-CM

## 2023-08-23 DIAGNOSIS — Z13.1 SCREENING FOR DIABETES MELLITUS: ICD-10-CM

## 2023-08-23 DIAGNOSIS — Z80.8 FAMILY HISTORY OF THYROID CANCER: ICD-10-CM

## 2023-08-23 DIAGNOSIS — Z72.0 TOBACCO ABUSE DISORDER: ICD-10-CM

## 2023-08-23 DIAGNOSIS — F90.0 ATTENTION DEFICIT HYPERACTIVITY DISORDER (ADHD), PREDOMINANTLY INATTENTIVE TYPE: ICD-10-CM

## 2023-08-23 DIAGNOSIS — F41.1 GAD (GENERALIZED ANXIETY DISORDER): ICD-10-CM

## 2023-08-23 DIAGNOSIS — R68.89 HEAT INTOLERANCE: ICD-10-CM

## 2023-08-23 DIAGNOSIS — Z23 ENCOUNTER FOR IMMUNIZATION: ICD-10-CM

## 2023-08-23 DIAGNOSIS — Z13.220 SCREENING CHOLESTEROL LEVEL: ICD-10-CM

## 2023-08-23 DIAGNOSIS — F33.1 MODERATE EPISODE OF RECURRENT MAJOR DEPRESSIVE DISORDER (H): ICD-10-CM

## 2023-08-23 DIAGNOSIS — M54.12 CERVICAL RADICULOPATHY: ICD-10-CM

## 2023-08-23 DIAGNOSIS — Z80.3 FAMILY HISTORY OF MALIGNANT NEOPLASM OF BREAST: ICD-10-CM

## 2023-08-23 PROCEDURE — 90471 IMMUNIZATION ADMIN: CPT | Performed by: STUDENT IN AN ORGANIZED HEALTH CARE EDUCATION/TRAINING PROGRAM

## 2023-08-23 PROCEDURE — 99395 PREV VISIT EST AGE 18-39: CPT | Mod: 25 | Performed by: STUDENT IN AN ORGANIZED HEALTH CARE EDUCATION/TRAINING PROGRAM

## 2023-08-23 PROCEDURE — 90715 TDAP VACCINE 7 YRS/> IM: CPT | Performed by: STUDENT IN AN ORGANIZED HEALTH CARE EDUCATION/TRAINING PROGRAM

## 2023-08-23 PROCEDURE — 99214 OFFICE O/P EST MOD 30 MIN: CPT | Mod: 25 | Performed by: STUDENT IN AN ORGANIZED HEALTH CARE EDUCATION/TRAINING PROGRAM

## 2023-08-23 RX ORDER — SERTRALINE HYDROCHLORIDE 100 MG/1
100 TABLET, FILM COATED ORAL DAILY
Qty: 90 TABLET | Refills: 1 | Status: SHIPPED | OUTPATIENT
Start: 2023-08-23

## 2023-08-23 RX ORDER — BUPROPION HYDROCHLORIDE 150 MG/1
150 TABLET ORAL EVERY MORNING
Qty: 90 TABLET | Refills: 1 | Status: SHIPPED | OUTPATIENT
Start: 2023-08-23

## 2023-08-23 RX ORDER — METHYLPHENIDATE HYDROCHLORIDE 20 MG/1
1 CAPSULE, EXTENDED RELEASE ORAL DAILY
COMMUNITY
Start: 2023-05-16 | End: 2023-08-23

## 2023-08-23 RX ORDER — METHYLPHENIDATE HYDROCHLORIDE 20 MG/1
20 CAPSULE, EXTENDED RELEASE ORAL DAILY
Qty: 30 CAPSULE | Refills: 0 | Status: SHIPPED | OUTPATIENT
Start: 2023-10-20

## 2023-08-23 RX ORDER — METHYLPHENIDATE HYDROCHLORIDE 20 MG/1
20 CAPSULE, EXTENDED RELEASE ORAL DAILY
Qty: 30 CAPSULE | Refills: 0 | Status: SHIPPED | OUTPATIENT
Start: 2023-09-21

## 2023-08-23 RX ORDER — METHYLPHENIDATE HYDROCHLORIDE 20 MG/1
1 CAPSULE, EXTENDED RELEASE ORAL DAILY
Qty: 30 CAPSULE | Refills: 0 | Status: SHIPPED | OUTPATIENT
Start: 2023-08-23

## 2023-08-23 ASSESSMENT — ENCOUNTER SYMPTOMS
EYE PAIN: 0
DYSURIA: 0
CONSTIPATION: 1
FEVER: 0
COUGH: 0
FREQUENCY: 0
DIARRHEA: 0
NERVOUS/ANXIOUS: 0
HEADACHES: 1
PALPITATIONS: 0
DIZZINESS: 0
WEAKNESS: 0
JOINT SWELLING: 0
ABDOMINAL PAIN: 1
MYALGIAS: 0
PARESTHESIAS: 0
SORE THROAT: 0
HEMATURIA: 0
HEMATOCHEZIA: 0
ARTHRALGIAS: 0
SHORTNESS OF BREATH: 1
NAUSEA: 0
CHILLS: 0
HEARTBURN: 0

## 2023-08-23 ASSESSMENT — ANXIETY QUESTIONNAIRES
GAD7 TOTAL SCORE: 6
2. NOT BEING ABLE TO STOP OR CONTROL WORRYING: SEVERAL DAYS
GAD7 TOTAL SCORE: 6
7. FEELING AFRAID AS IF SOMETHING AWFUL MIGHT HAPPEN: NOT AT ALL
1. FEELING NERVOUS, ANXIOUS, OR ON EDGE: SEVERAL DAYS
4. TROUBLE RELAXING: SEVERAL DAYS
5. BEING SO RESTLESS THAT IT IS HARD TO SIT STILL: NOT AT ALL
IF YOU CHECKED OFF ANY PROBLEMS ON THIS QUESTIONNAIRE, HOW DIFFICULT HAVE THESE PROBLEMS MADE IT FOR YOU TO DO YOUR WORK, TAKE CARE OF THINGS AT HOME, OR GET ALONG WITH OTHER PEOPLE: SOMEWHAT DIFFICULT
3. WORRYING TOO MUCH ABOUT DIFFERENT THINGS: SEVERAL DAYS
6. BECOMING EASILY ANNOYED OR IRRITABLE: MORE THAN HALF THE DAYS

## 2023-08-23 ASSESSMENT — PATIENT HEALTH QUESTIONNAIRE - PHQ9
SUM OF ALL RESPONSES TO PHQ QUESTIONS 1-9: 4
10. IF YOU CHECKED OFF ANY PROBLEMS, HOW DIFFICULT HAVE THESE PROBLEMS MADE IT FOR YOU TO DO YOUR WORK, TAKE CARE OF THINGS AT HOME, OR GET ALONG WITH OTHER PEOPLE: NOT DIFFICULT AT ALL
SUM OF ALL RESPONSES TO PHQ QUESTIONS 1-9: 4

## 2023-08-23 NOTE — PROGRESS NOTES
SUBJECTIVE:   CC: Milagro is an 37 year old who presents for preventive health visit.       8/23/2023     3:55 PM   Additional Questions   Accompanied by na         8/23/2023     3:55 PM   Patient Reported Additional Medications   Patient reports taking the following new medications      Answers submitted by the patient for this visit:  Patient Health Questionnaire (Submitted on 8/23/2023)  If you checked off any problems, how difficult have these problems made it for you to do your work, take care of things at home, or get along with other people?: Not difficult at all  PHQ9 TOTAL SCORE: 4  LISA-7 (Submitted on 8/23/2023)  LISA 7 TOTAL SCORE: 6    Healthy Habits:     Getting at least 3 servings of Calcium per day:  NO    Bi-annual eye exam:  NO    Dental care twice a year:  NO    Sleep apnea or symptoms of sleep apnea:  Daytime drowsiness    Diet:  Regular (no restrictions)    Frequency of exercise:  None    Taking medications regularly:  No    Barriers to taking medications:  Problems remembering to take them    Medication side effects:  None    Additional concerns today:  No    ADHD-  On Ritalin LA 20mg and this works well. Had tried Adderall brand name (lost 30 lbs, wasn't sleeping), generic straterra (anxiety).   No side effects with Ritalin.   Works from home on computer, [employer].   Takes medication on weekends too. Helps also with mood management.   Ran out a couple months ago and has been struggling with this.     MH: Anxiety and Depression   Doesn't take medication regularly. Forgetful  Gets agitated easily and aggressive response  More panic attacks due to trauma in past   Not in therapy now, has gone to [facility]   On Zoloft 100 mg and wellbutrin 150 mg.  Started on wellbutrin to help with mental health and smoking cessation - often forgets to take her medications regularly so when she does take them has less cravings.     Tobacco use - smokes and vapes. Just dropped nicotine to 0% on vape, smoking 4-5  cig/day. Has been cutting back. Was at 1/2 PPD and 12% nicotine on vape and would go through 3-4 mL per day.     Occasional EtOH use. No THC or drug use     Ortho-  Refill Zanaflex  Uses this for right shoulder   MRI - tendinosis of supraspinatus, labral tear   Has tried PT for her shoulder and hasn't found it very helpful   Hasn't had as much pain recently but when does have pain can't barely move her shoulder or lift - when bad takes tizanidine and naproxen.   When saw Sports Medicine also had cervical radiculopathy this improved - no shooting pain or numbness/tingling down the arm    Pap due       Eating habits -try to eat relatively healthy, more home cooking, not a lot of red meat, balanced, veggies. Often forgets breakfast and lunch  but when does more processed food and sweet tooth. Typically drinks 20-30 oz per day. Coffee - 16 oz/day.     Not exercising as much. Smoking makes exercise harder. Doesn't have heat tolerance. Gets heat cramps, more prone to dehydration, headaches.       Has a lot of cancers in family - had seen genetic counselor and negative for BRCA. PGM osteosarcoma. Dad - brain cancer, glioblastoma, PGF thyroid cancer. No contact with birth mom. Mom - breast and cervical cancer and small cell carcinoma. Breast cancer in 20s. Two maternal aunts had breast cancer    one lifetime male sexual partner.         Have you ever done Advance Care Planning? (For example, a Health Directive, POLST, or a discussion with a medical provider or your loved ones about your wishes): No, advance care planning information given to patient to review.  Patient declined advance care planning discussion at this time.    Social History     Tobacco Use    Smoking status: Every Day     Packs/day: 0.25     Years: 1.00     Pack years: 0.25     Types: Other, Cigarettes     Start date: 2007     Last attempt to quit: 2017     Years since quittin.5    Smokeless tobacco: Current    Tobacco comments:     4-5  ciggs per day    Substance Use Topics    Alcohol use: Yes     Comment: rare           2023     3:47 PM   Alcohol Use   Prescreen: >3 drinks/day or >7 drinks/week? No     Reviewed orders with patient.  Reviewed health maintenance and updated orders accordingly - Yes  Lab work is in process  Labs reviewed in EPIC  BP Readings from Last 3 Encounters:   23 124/80   22 118/64   22 126/80    Wt Readings from Last 3 Encounters:   23 109.3 kg (241 lb)   22 104.2 kg (229 lb 12.8 oz)   22 105.2 kg (232 lb)                  Patient Active Problem List   Diagnosis    Moderate major depression (H)    CARDIOVASCULAR SCREENING; LDL GOAL LESS THAN 160    Migraine headache with aura    ADD (attention deficit hyperactivity disorder, inattentive type)    Chronic bilateral low back pain without sciatica    Tobacco abuse disorder    Family history of malignant neoplasm of breast    Gastroesophageal reflux disease with esophagitis without hemorrhage    Family history of prostate cancer    Family history of pancreatic cancer    Family history of uterine cancer    Chronic right shoulder pain    Tear of right glenoid labrum, initial encounter    LISA (generalized anxiety disorder)     Past Surgical History:   Procedure Laterality Date    COSMETIC SURGERY  late     mole removal: groin area and neck       Social History     Tobacco Use    Smoking status: Every Day     Packs/day: 0.25     Years: 1.00     Pack years: 0.25     Types: Other, Cigarettes     Start date: 2007     Last attempt to quit: 2017     Years since quittin.5    Smokeless tobacco: Current    Tobacco comments:     4-5 ciggs per day    Substance Use Topics    Alcohol use: Yes     Comment: rare     Family History   Problem Relation Age of Onset    Unknown/Adopted Mother     Other Cancer Mother         cervical, melanoma, small cell carcinoma    Depression/Anxiety Mother         Bipolar    Depression Mother         Bipolar  Disorder    Mental Illness Mother         bi-polar    Breast Cancer Mother     Cancer Father 44        brain    Other Cancer Father         Glioblastoma    Depression/Anxiety Father         PTSD    Chemical Addiction Father         Marijuana    Depression Father         Depression    Mental Illness Father         ptsd, depression (untreated)    Asthma Father         childhood    Anxiety Disorder Father         PTSD    Substance Abuse Father         Marijuana    Diabetes Paternal Grandmother         type II caused by obesity    Other Cancer Paternal Grandmother         Osteosarcoma    Mental Illness Paternal Grandmother         Dementia    Osteoporosis Paternal Grandmother     Heart Disease Paternal Grandfather     Coronary Artery Disease Paternal Grandfather     Hypertension Paternal Grandfather     Hyperlipidemia Paternal Grandfather     Prostate Cancer Paternal Grandfather     Chemical Addiction Paternal Grandfather         Alcoholism    Diabetes Paternal Grandfather         type I caused by pancreatitis    Substance Abuse Paternal Grandfather         alcoholic    Thyroid Disease Paternal Grandfather     Other Cancer Paternal Grandfather         Lymphoma    Unknown/Adopted Maternal Grandmother     Depression/Anxiety Maternal Grandmother         Bipolar    Other Cancer Maternal Grandmother         Small Cell Carcinoma    Breast Cancer Maternal Grandmother     Mental Illness Maternal Grandmother         Bipolar Disorder    Unknown/Adopted Maternal Grandfather     Diabetes Maternal Grandfather     Breast Cancer Other     Prostate Cancer Other         Great Grandfather    Chemical Addiction Other         Alcoholism    Prostate Cancer Other     Chemical Addiction Other         Alcoholism    Substance Abuse Other         alcoholic    Other Cancer Other         Small Cell Carcinoma    Breast Cancer Other     Mental Illness Other         Bipolar Disorder    Cerebrovascular Disease Other     Substance Abuse Other          alcoholic    Prostate Cancer Other     Chemical Addiction Other         Alcoholism         Current Outpatient Medications   Medication Sig Dispense Refill    methylphenidate (RITALIN LA) 20 MG 24 hr capsule Take 1 capsule by mouth daily      acetaminophen (TYLENOL) 500 MG tablet 1,000 mg as needed 2 tablets once daily      Aspirin-Acetaminophen-Caffeine (EXCEDRIN PO) Take by mouth as needed      buPROPion (WELLBUTRIN XL) 150 MG 24 hr tablet Take 1 tablet (150 mg) by mouth every morning 90 tablet 0    naproxen (NAPROSYN) 250 MG tablet Take 250 mg by mouth 2 times daily (with meals)      sertraline (ZOLOFT) 100 MG tablet TAKE 1 TABLET(100 MG) BY MOUTH DAILY 90 tablet 1    tiZANidine (ZANAFLEX) 4 MG tablet Take 1 tablet (4 mg) by mouth nightly as needed for muscle spasms 30 tablet 0     No Known Allergies    Breast Cancer Screening:    FHS-7:       6/1/2021     1:50 PM   Breast CA Risk Assessment (FHS-7)   Did any of your first-degree relatives have breast or ovarian cancer? Yes   Did any of your relatives have bilateral breast cancer? Yes   Did any man in your family have breast cancer? No   Did any woman in your family have breast and ovarian cancer? Yes   Did any woman in your family have breast cancer before age 50 y? Yes   Do you have 2 or more relatives with breast and/or ovarian cancer? Yes   Do you have 2 or more relatives with breast and/or bowel cancer? Yes     click delete button to remove this line now  Patient under 40 years of age: Routine Mammogram Screening not recommended.   Pertinent mammograms are reviewed under the imaging tab.    History of abnormal Pap smear: NO - age 30-65 PAP every 5 years with negative HPV co-testing recommended  Last 3 Pap and HPV Results:       Latest Ref Rng & Units 3/9/2020     3:28 PM 3/9/2020     3:20 PM 8/17/2015    12:00 AM   PAP / HPV   PAP (Historical)  NIL   NIL    HPV 16 DNA NEG^Negative  Negative     HPV 18 DNA NEG^Negative  Negative     Other HR HPV NEG^Negative   "Negative           Latest Ref Rng & Units 3/9/2020     3:28 PM 3/9/2020     3:20 PM 8/17/2015    12:00 AM   PAP / HPV   PAP (Historical)  NIL   NIL    HPV 16 DNA NEG^Negative  Negative     HPV 18 DNA NEG^Negative  Negative     Other HR HPV NEG^Negative  Negative       Reviewed and updated as needed this visit by clinical staff   Tobacco  Allergies               Reviewed and updated as needed this visit by Provider                 Past Medical History:   Diagnosis Date    Coronary artery disease 2010    usually caused from anxiety    Depressive disorder 2004    seen by 3 different psychs, no conclusive diagnosis      Past Surgical History:   Procedure Laterality Date    COSMETIC SURGERY  late 90s    mole removal: groin area and neck       Review of Systems   Constitutional:  Negative for chills and fever.   HENT:  Negative for congestion, ear pain, hearing loss and sore throat.    Eyes:  Negative for pain and visual disturbance.   Respiratory:  Positive for shortness of breath. Negative for cough.    Cardiovascular:  Negative for chest pain, palpitations and peripheral edema.   Gastrointestinal:  Positive for abdominal pain and constipation. Negative for diarrhea, heartburn, hematochezia and nausea.   Genitourinary:  Negative for dysuria, frequency, genital sores, hematuria and urgency.   Musculoskeletal:  Negative for arthralgias, joint swelling and myalgias.   Skin:  Negative for rash.   Neurological:  Positive for headaches. Negative for dizziness, weakness and paresthesias.   Psychiatric/Behavioral:  Positive for mood changes. The patient is not nervous/anxious.           OBJECTIVE:   /80 (Cuff Size: Adult Large)   Pulse 102   Temp 98.2  F (36.8  C) (Oral)   Ht 1.71 m (5' 7.32\")   Wt 109.3 kg (241 lb)   LMP 08/23/2023   SpO2 97%   BMI 37.38 kg/m    Physical Exam  GENERAL: healthy, alert and no distress  EYES: Eyes grossly normal to inspection, PERRL and conjunctivae and sclerae normal  HENT: ear " canals and TM's normal, nose and mouth without ulcers or lesions  NECK: no adenopathy, no asymmetry, masses, or scars and thyroid normal to palpation  RESP: lungs clear to auscultation - no rales, rhonchi or wheezes  CV: regular rate and rhythm, normal S1 S2, no S3 or S4, no murmur, click or rub, no peripheral edema and peripheral pulses strong  ABDOMEN: soft, nontender, no hepatosplenomegaly, no masses and bowel sounds normal  MS: no gross musculoskeletal defects noted, no edema  SKIN: no suspicious lesions or rashes  NEURO: Normal strength and tone, mentation intact and speech normal  PSYCH: mentation appears normal, affect normal/bright    Diagnostic Test Results:  Labs reviewed in Epic    ASSESSMENT/PLAN:   (Z00.00) Routine general medical examination at a health care facility  (primary encounter diagnosis)  Plan: REVIEW OF HEALTH MAINTENANCE PROTOCOL ORDERS            (F33.1) Moderate episode of recurrent major depressive disorder (H)  (F41.1) LISA (generalized anxiety disorder)  Comment: mental health has been stable on current dose of wellbutrin and zoloft. We will continue. She will work on taking medication more regularly.   Plan: sertraline (ZOLOFT) 100 MG tablet, CBC with         platelets and differential            (F90.0) ADD (attention deficit hyperactivity disorder, inattentive type)  Comment: has been out of ritalin LA for a couple of months with worsening symptoms of ADD and mental health; we will restart at same dose.   Plan: methylphenidate (RITALIN LA) 20 MG 24 hr         capsule, methylphenidate (RITALIN LA) 20 MG 24         hr capsule, methylphenidate (RITALIN LA) 20 MG         24 hr capsule            (Z72.0) Tobacco abuse disorder  Comment: has cut back on smoking and vaping with help of wellbutrin, forgets to take medication though at times  Plan: buPROPion (WELLBUTRIN XL) 150 MG 24 hr tablet            (E66.9,  Z68.37) Class 2 obesity without serious comorbidity with body mass index (BMI)  of 37.0 to 37.9 in adult, unspecified obesity type  Comment: we will check Thyroid and CMP and screenign labs. She will work on increasing physical activity and cutting  back on sweets  Plan: TSH with free T4 reflex, Comprehensive         metabolic panel (BMP + Alb, Alk Phos, ALT, AST,        Total. Bili, TP)            (S43.431D) Tear of right glenoid labrum, subsequent encounter  Comment: previously saw ortho and sports medicine. Has done PT a few times; overall symptoms improved but still flare. Will continue doing home exercises     (M54.12) Cervical radiculopathy  Comment: symptoms have improved but still uses tizanidine as needed for tightness in shoulder   Plan: tiZANidine (ZANAFLEX) 4 MG tablet            (R68.89) Heat intolerance  Comment: check thyroid and anemia. Discussed need for more water intake at baseline and with exertion   Plan: TSH with free T4 reflex, CBC with platelets and        differential            (Z80.8) Family history of thyroid cancer  Comment: will check TSH     (Z23) Encounter for immunization  Plan: TDAP 10-64Y (ADACEL,BOOSTRIX)            (Z13.1) Screening for diabetes mellitus  Plan: Comprehensive metabolic panel (BMP + Alb, Alk         Phos, ALT, AST, Total. Bili, TP)            (Z13.220) Screening cholesterol level  Plan: Lipid panel reflex to direct LDL Fasting            (Z80.3) Family history of malignant neoplasm of breast  Comment: mother and two maternal aunts with breast cancer history, she believes <39yo at diagnosis but doesn't have much interaction with birth mother to confirm. Recommend doing a screening now at age 37. Has had genetic testing and was negative for BRCA  Plan: *MA Screening Digital Bilateral            Patient has been advised of split billing requirements and indicates understanding: Yes      COUNSELING:  Reviewed preventive health counseling, as reflected in patient instructions       Regular exercise       Healthy diet/nutrition        She reports  that she has been smoking other and cigarettes. She started smoking about 16 years ago. She has a 0.25 pack-year smoking history. She uses smokeless tobacco.  Nicotine/Tobacco Cessation Plan:   Pharmacotherapies : bupropion (Zyban)          Sarah [facility]

## 2023-08-23 NOTE — PATIENT INSTRUCTIONS
Mammogram Scheduling  [facility] [phone] or [phone]  [facility] [phone]      Preventive Health Recommendations  Female Ages 26 - 39  Yearly exam:   See your health care provider every year in order to  Review health changes.   Discuss preventive care.    Review your medicines if you your doctor has prescribed any.    Until age 30: Get a Pap test every three years (more often if you have had an abnormal result).    After age 30: Talk to your doctor about whether you should have a Pap test every 3 years or have a Pap test with HPV screening every 5 years.   You do not need a Pap test if your uterus was removed (hysterectomy) and you have not had cancer.  You should be tested each year for STDs (sexually transmitted diseases), if you're at risk.   Talk to your provider about how often to have your cholesterol checked.  If you are at risk for diabetes, you should have a diabetes test (fasting glucose).  Shots: Get a flu shot each year. Get a tetanus shot every 10 years.   Nutrition:   Eat at least 5 servings of fruits and vegetables each day.  Eat whole-grain bread, whole-wheat pasta and brown rice instead of white grains and rice.  Get adequate Calcium and Vitamin D.     Lifestyle  Exercise at least 150 minutes a week (30 minutes a day, 5 days of the week). This will help you control your weight and prevent disease.  Limit alcohol to one drink per day.  No smoking.   Wear sunscreen to prevent skin cancer.  See your dentist every six months for an exam and cleaning.

## 2023-09-24 NOTE — LETTER
5/24/2019      RE: Milagro Ferrera  [address] [place] [address] [address]  [facility] [id number]        Subjective:   Milagro Ferrera is a 33 year old female who is f/u for her lumbar MRI.  Working out at home.  No chance for pool therapy yet.  Pain meds and muscle relaxers.  Doesn't come home in pain.  Lifting a dog, bringing dog close and then standing up.  Dog flailing is an issue.  No numbness or tingling.  No incontinence.  Occasionally there is pain, sometimes through the hips.  Through the hip canal is what it feels like.  Right L4  Tightness in back and shoulder, bad posture  Pt denies leg pain or any radiculopathy  Schedule is a nightmare, doesn't have next week schedule, needs to work.  3 days a week, 35 lbs, 8 hour shifts    Date last seen: 5/8/2019  Interval History: MRI    PAST MEDICAL, SOCIAL, SURGICAL AND FAMILY HISTORY: She  has a past medical history of Coronary artery disease (2010) and Depressive disorder (2004). She also has no past medical history of Arthritis, Cancer (H), Cerebral infarction (H), Congestive heart failure (H), Congestive heart failure, unspecified, COPD (chronic obstructive pulmonary disease) (H), CVA (cerebral infarction), Diabetes (H), History of blood transfusion, Hypertension, Thyroid disease, or Uncomplicated asthma.  She  has a past surgical history that includes Cosmetic surgery (late 90s).  Her family history includes Anxiety Disorder in her father; Asthma in her father; Breast Cancer in her maternal grandmother, mother, and other family members; Cancer (age of onset: 44) in her father; Cerebrovascular Disease in an other family member; Chemical Addiction in her father, paternal grandfather, and other family members; Coronary Artery Disease in her paternal grandfather; Depression in her father and mother; Depression/Anxiety in her father, maternal grandmother, and mother; Diabetes in her maternal grandfather, paternal grandfather, and paternal grandmother; Heart Disease in  her paternal grandfather; Hyperlipidemia in her paternal grandfather; Hypertension in her paternal grandfather; Mental Illness in her father, maternal grandmother, mother, paternal grandmother, and another family member; Osteoporosis in her paternal grandmother; Other Cancer in her father, maternal grandmother, mother, paternal grandfather, paternal grandmother, and another family member; Prostate Cancer in her paternal grandfather and other family members; Substance Abuse in her father, paternal grandfather, and other family members; Thyroid Disease in her paternal grandfather; Unknown/Adopted in her maternal grandfather, maternal grandmother, and mother.  She reports that she has been smoking other.  She started smoking about 12 years ago. She has a 0.10 pack-year smoking history. She uses smokeless tobacco. She reports that she does not drink alcohol or use drugs.    ALLERGIES: She has No Known Allergies.    CURRENT MEDICATIONS: She has a current medication list which includes the following prescription(s): ibuprofen, levonorgestrel-ethinyl estradiol, naproxen, nortriptyline, sertraline, and tizanidine.     REVIEW OF SYSTEMS: 10 point review of systems is negative except as noted above.     Exam:   LMP 04/26/2019 (Exact Date)            CONSTITUTIONAL: healthy, alert, no distress and cooperative  HEAD: Normocephalic. No masses, lesions, tenderness or abnormalities  SKIN: no suspicious lesions or rashes  GAIT: normal  NEUROLOGIC: Non-focal  PSYCHIATRIC: affect normal/bright and mentation appears normal.    MUSCULOSKELETAL: low back pain, more on the right  Tender:  right para lumbar muscles  Non-tender:  thoracic spinous processes, left parathoracic muscles, right parathoracic muscles, lumbar spinous processes, left para lumbar muscles  Range of Motion:  lumbar flexion  decreased, lumbar extension  full  Strength:  able to heel walk, able to toe walk  Special tests:  negative straight leg raises    Hip Exam: Hip  ROM full       Assessment/Plan:   Pt is a 34 yo white female with PMhx of depression, migraine presenting with right sided LBP, L4    1. Right sided LBP, L4- discussed MRI  Pursue strengthening, pool therapy encouraged  Consider injection if worsening    RTC 8 weeks    X-RAY INTERPRETATION:   MRI lumbar  Impression: Central disc extrusion at L4-5 with mild spinal canal  narrowing, lateral recess narrowing, and neural foraminal narrowing.    Irina Beltran MD     No

## 2024-07-24 ENCOUNTER — PATIENT OUTREACH (OUTPATIENT)
Dept: CARE COORDINATION | Facility: CLINIC | Age: 39
End: 2024-07-24
Payer: COMMERCIAL

## 2024-08-07 ENCOUNTER — PATIENT OUTREACH (OUTPATIENT)
Dept: CARE COORDINATION | Facility: CLINIC | Age: 39
End: 2024-08-07
Payer: COMMERCIAL

## 2024-10-13 ENCOUNTER — HEALTH MAINTENANCE LETTER (OUTPATIENT)
Age: 39
End: 2024-10-13

## (undated) RX ORDER — LIDOCAINE HYDROCHLORIDE 10 MG/ML
INJECTION, SOLUTION EPIDURAL; INFILTRATION; INTRACAUDAL; PERINEURAL
Status: DISPENSED
Start: 2021-11-11